# Patient Record
Sex: FEMALE | Race: WHITE | NOT HISPANIC OR LATINO | Employment: STUDENT | URBAN - METROPOLITAN AREA
[De-identification: names, ages, dates, MRNs, and addresses within clinical notes are randomized per-mention and may not be internally consistent; named-entity substitution may affect disease eponyms.]

---

## 2018-10-04 ENCOUNTER — RADIANT APPOINTMENT (OUTPATIENT)
Dept: ULTRASOUND IMAGING | Facility: CLINIC | Age: 23
End: 2018-10-04
Attending: PHYSICIAN ASSISTANT
Payer: COMMERCIAL

## 2018-10-04 ENCOUNTER — MEDICAL CORRESPONDENCE (OUTPATIENT)
Dept: HEALTH INFORMATION MANAGEMENT | Facility: CLINIC | Age: 23
End: 2018-10-04

## 2018-10-04 ENCOUNTER — TRANSFERRED RECORDS (OUTPATIENT)
Dept: HEALTH INFORMATION MANAGEMENT | Facility: CLINIC | Age: 23
End: 2018-10-04

## 2018-10-04 DIAGNOSIS — R30.0 DYSURIA: ICD-10-CM

## 2018-10-04 DIAGNOSIS — R10.2 PELVIC PAIN: ICD-10-CM

## 2018-10-09 ENCOUNTER — PRE VISIT (OUTPATIENT)
Dept: UROLOGY | Facility: CLINIC | Age: 23
End: 2018-10-09

## 2018-10-09 NOTE — TELEPHONE ENCOUNTER
MEDICAL RECORDS REQUEST   [facility]  Urology Clinic  [address] [place] [address]  PHONE: [phone]  Fax: [phone]        FUTURE VISIT INFORMATION                                                   Rubi Gonsales, : 1995 scheduled for future visit at [facility]    APPOINTMENT INFORMATION:    Date: 2018    Provider:  Jennyfer Sheehan    Reason for Visit/Diagnosis: Pelvic Pain Dysuria    REFERRAL INFORMATION:    Referring provider:  Maggi Geller    Specialty: MD    Referring providers clinic:  [facility] contact number:  [phone]    RECORDS REQUESTED FOR VISIT                                                     NOTES  STATUS/DETAILS   OFFICE NOTE from referring provider  yes   OFFICE NOTE from other specialist  no   DISCHARGE SUMMARY from hospital  no   DISCHARGE REPORT from the ER  no   OPERATIVE REPORT  no   MEDICATION LIST  yes       PRE-VISIT CHECKLIST      Record collection complete Yes   Appointment appropriately scheduled           (right time/right provider) Yes   MyChart activation If no, please explain in process   Questionnaire complete If no, please explain in process     Completed by: Mercy Arguelles

## 2018-11-20 ENCOUNTER — PRE VISIT (OUTPATIENT)
Dept: UROLOGY | Facility: CLINIC | Age: 23
End: 2018-11-20

## 2018-11-20 NOTE — TELEPHONE ENCOUNTER
Patient with history of pelvic pain and dysuria coming in for consult. Patient chart reviewed, no need for call, all records available and ready for appointment.  UA dip and PVR.

## 2018-11-28 ENCOUNTER — OFFICE VISIT (OUTPATIENT)
Dept: UROLOGY | Facility: CLINIC | Age: 23
End: 2018-11-28
Payer: COMMERCIAL

## 2018-11-28 VITALS
SYSTOLIC BLOOD PRESSURE: 116 MMHG | WEIGHT: 160 LBS | DIASTOLIC BLOOD PRESSURE: 69 MMHG | HEART RATE: 59 BPM | BODY MASS INDEX: 24.25 KG/M2 | HEIGHT: 68 IN

## 2018-11-28 DIAGNOSIS — N30.00 ACUTE CYSTITIS WITHOUT HEMATURIA: Primary | ICD-10-CM

## 2018-11-28 LAB
APPEARANCE UR: CLEAR
BILIRUB UR QL: NORMAL
COLOR UR: YELLOW
GLUCOSE URINE: NORMAL MG/DL
HGB UR QL: NORMAL
KETONES UR QL: NORMAL MG/DL
LEUKOCYTE ESTERASE URINE: NORMAL
NITRITE UR QL STRIP: NORMAL
PH UR STRIP: 5.5 PH (ref 5–7)
PROTEIN ALBUMIN URINE: NORMAL MG/DL
SOURCE: NORMAL
SP GR UR STRIP: 1 (ref 1–1.03)
UROBILINOGEN UR QL STRIP: 0.2 EU/DL (ref 0.2–1)

## 2018-11-28 RX ORDER — NITROFURANTOIN 25; 75 MG/1; MG/1
CAPSULE ORAL
Refills: 0 | COMMUNITY
Start: 2018-09-22 | End: 2020-03-06

## 2018-11-28 ASSESSMENT — ENCOUNTER SYMPTOMS
DIFFICULTY URINATING: 0
FLANK PAIN: 0
DYSURIA: 1
HEMATURIA: 0

## 2018-11-28 ASSESSMENT — PAIN SCALES - GENERAL: PAINLEVEL: NO PAIN (0)

## 2018-11-28 NOTE — NURSING NOTE
Chief Complaint   Patient presents with     Consult     pelvic pain and dysuria coming in for consult       Zoe Kennedy MA

## 2018-11-28 NOTE — LETTER
"11/28/2018       RE: Rubi Gonsales  [address]  [address]  [place] [address]     Dear Colleague,    Thank you for referring your patient, Rubi Gonsales, to the [facility] UROLOGY AND INST FOR PROSTATE AND UROLOGIC CANCERS at [facility]. Please see a copy of my visit note below.    We are pleased to see Miss. Rubi Gonsales in consultation at the request of Maggi Geller for the evaluation of chief complaint listed below    Chief Complaint:    Ho hematuria and dysuria          History of Present Illness:   Rubi Gonsales is a(n) 22 year old female w/ no significant PMH  or urologic history who started having hematuria and dysuria and was diagnosed with pyelonephritis in setting of positive UA and elevated WBC. Her symptoms improved  With antibiotics and but then had 2 episodes of dysuria and hematuria that were treated and got better with antibiotics (although Cx has been negative)  Since then she has been doing okay with no symptoms except discomfort in bladder area. No more gross hematuria            Past Medical History:   None          Past Surgical History:   None          Social History:           Smoking: none  Alcohol: occasional   IV Drug Use: None         Family History:   No family history on file.  No urologic cancers in the family.         Allergies:     Allergies   Allergen Reactions     Cephalosporins Rash     Penicillins Rash            Medications:     Current Outpatient Prescriptions   Medication Sig     FLUoxetine (PROZAC) 20 MG capsule TAKE 1 CAPSULE BY MOUTH EVERY DAY     nitroFURantoin macrocrystal-monohydrate (MACROBID) 100 MG capsule TAKE 1 CAPSULE BY MOUTH TWO TIMES A DAY FOR 7 DAYS.     No current facility-administered medications for this visit.           PHYSICAL EXAM   /69  Pulse 59  Ht 1.727 m (5' 8\")  Wt 72.6 kg (160 lb)  BMI 24.33 kg/m2  GENERAL: No acute distress. Well nourished.   HEENT:  Sclerae anicteric.  Conjunctivae pink.  Moist " mucous membranes.  NECK:  Supple.  No lymphadenopathy.  CARDIAC:  Regular rate and rhythm.  LUNGS:  Non-labored breathing  BACK:  No costovertebral tenderness.  ABDOMEN: Soft, non-tender, no surgical scars, no organomegaly, non-tender.  SKIN: No rashes.  Dry.     EXTREMITIES:  Warm, well perfused.  no lower extremity edema bilaterally.  NEURO: normal gait, no focal deficits.           LABS AND IMAGING:   Renal and pelvis US reviewed, WNL          ASSESSMENT:   Rubi Gonsales is a 22 year old female who presents for recent UTIs.   No anatomic abnormalities seen on imaging and patient is not having symptoms at this point   We discussed that we will hold off any more work up for now unless she has more infections or symptoms. She will call with updates     Attila Cordova MD  Urology PGY4    Patient was seen, evaluated and plan was formulated in conjunction with me and I agree with the above.  Jennyfer Sheehan MD    CC: Duyen

## 2018-11-28 NOTE — PROGRESS NOTES
"We are pleased to see Miss. Rubi Gonsales in consultation at the request of Maggi Geller for the evaluation of chief complaint listed below    Chief Complaint:    Ho hematuria and dysuria          History of Present Illness:   Rubi Gonsales is a(n) 22 year old female w/ no significant PMH  or urologic history who started having hematuria and dysuria and was diagnosed with pyelonephritis in setting of positive UA and elevated WBC. Her symptoms improved  With antibiotics and but then had 2 episodes of dysuria and hematuria that were treated and got better with antibiotics (although Cx has been negative)  Since then she has been doing okay with no symptoms except discomfort in bladder area. No more gross hematuria            Past Medical History:   None          Past Surgical History:   None          Social History:           Smoking: none  Alcohol: occasional   IV Drug Use: None         Family History:   No family history on file.  No urologic cancers in the family.         Allergies:     Allergies   Allergen Reactions     Cephalosporins Rash     Penicillins Rash            Medications:     Current Outpatient Prescriptions   Medication Sig     FLUoxetine (PROZAC) 20 MG capsule TAKE 1 CAPSULE BY MOUTH EVERY DAY     nitroFURantoin macrocrystal-monohydrate (MACROBID) 100 MG capsule TAKE 1 CAPSULE BY MOUTH TWO TIMES A DAY FOR 7 DAYS.     No current facility-administered medications for this visit.             REVIEW OF SYSTEMS:    See HPI for pertinent details.  Remainder of 10-point ROS negative.         PHYSICAL EXAM   /69  Pulse 59  Ht 1.727 m (5' 8\")  Wt 72.6 kg (160 lb)  BMI 24.33 kg/m2  GENERAL: No acute distress. Well nourished.   HEENT:  Sclerae anicteric.  Conjunctivae pink.  Moist mucous membranes.  NECK:  Supple.  No lymphadenopathy.  CARDIAC:  Regular rate and rhythm.  LUNGS:  Non-labored breathing  BACK:  No costovertebral tenderness.  ABDOMEN: Soft, non-tender, no surgical scars, no " organomegaly, non-tender.  SKIN: No rashes.  Dry.     EXTREMITIES:  Warm, well perfused.  no lower extremity edema bilaterally.  NEURO: normal gait, no focal deficits.           LABS AND IMAGING:   Renal and pelvis US reviewed, WNL          ASSESSMENT:   Rubi Gonsales is a 22 year old female who presents for recent UTIs.   No anatomic abnormalities seen on imaging and patient is not having symptoms at this point   We discussed that we will hold off any more work up for now unless she has more infections or symptoms. She will call with updates     Attila Cordova MD  Urology PGY4    Patient was seen, evaluated and plan was formulated in conjunction with me and I agree with the above.  Jennyfer Sheehan MD    CC: Duyen    Answers for HPI/ROS submitted by the patient on 11/28/2018   General Symptoms: No  Skin Symptoms: No  HENT Symptoms: No  EYE SYMPTOMS: No  HEART SYMPTOMS: No  LUNG SYMPTOMS: No  INTESTINAL SYMPTOMS: No  URINARY SYMPTOMS: Yes  GYNECOLOGIC SYMPTOMS: No  BREAST SYMPTOMS: No  SKELETAL SYMPTOMS: No  BLOOD SYMPTOMS: No  NERVOUS SYSTEM SYMPTOMS: No  MENTAL HEALTH SYMPTOMS: No  Trouble holding urine or incontinence: No  Pain or burning: Yes  Trouble starting or stopping: No  Increased frequency of urination: Yes  Blood in urine: No  Decreased frequency of urination: No  Frequent nighttime urination: No  Flank pain: No  Difficulty emptying bladder: No

## 2018-11-28 NOTE — MR AVS SNAPSHOT
"              After Visit Summary   11/28/2018    Rubi Gonsales    MRN: [id number]           Patient Information     Date Of Birth          1995        Visit Information        Provider Department      11/28/2018 2:00 PM Jennyfer Sheehan MD [facility]        Today's Diagnoses     Acute cystitis without hematuria    -  1       Follow-ups after your visit        Follow-up notes from your care team     Return if symptoms worsen or fail to improve.      Who to contact     Please call your clinic at [phone] to:    Ask questions about your health    Make or cancel appointments    Discuss your medicines    Learn about your test results    Speak to your doctor            Additional Information About Your Visit        Care EveryWhere ID     This is your Care EveryWhere ID. This could be used by other organizations to access your [facility] medical records  [id number]        Your Vitals Were     Pulse Height BMI (Body Mass Index)             59 1.727 m (5' 8\") 24.33 kg/m2          Blood Pressure from Last 3 Encounters:   11/28/18 116/69    Weight from Last 3 Encounters:   11/28/18 72.6 kg (160 lb)              We Performed the Following     POST-VOID RESIDUAL BLADDER SCAN     Urinalysis chemical screen POCT        Primary Care Provider Office Phone # Fax #    [facility] MD Mkaenzie [phone]       [address] [place] [address]        Equal Access to Services     LATONIA DOBBS AH: Hadii marcella ku hadasho Soomaali, waaxda luqadaha, qaybta kaalmada adeegyada, chris gaviriain haycelinan nish santa. So [facility] [phone].    ATENCIÓN: Si habla español, tiene a menendez disposición servicios gratuitos de asistencia lingüística. Llame al [phone].    We comply with applicable federal civil rights laws and [place] laws. We do not discriminate on the basis of race, color, national origin, age, disability, sex, sexual orientation, or gender " identity.            Thank you!     Thank you for choosing [facility] UROLOGY AND [facility] FOR PROSTATE AND UROLOGIC CANCERS  for your care. Our goal is always to provide you with excellent care. Hearing back from our patients is one way we can continue to improve our services. Please take a few minutes to complete the written survey that you may receive in the mail after your visit with us. Thank you!             Your Updated Medication List - Protect others around you: Learn how to safely use, store and throw away your medicines at www.disposemymeds.org.          This list is accurate as of 11/28/18  3:06 PM.  Always use your most recent med list.                   Brand Name Dispense Instructions for use Diagnosis    FLUoxetine 20 MG capsule    PROzac     TAKE 1 CAPSULE BY MOUTH EVERY DAY        nitroFURantoin macrocrystal-monohydrate 100 MG capsule    MACROBID     TAKE 1 CAPSULE BY MOUTH TWO TIMES A DAY FOR 7 DAYS.

## 2019-03-07 ENCOUNTER — TRANSFERRED RECORDS (OUTPATIENT)
Dept: MULTI SPECIALTY CLINIC | Facility: CLINIC | Age: 24
End: 2019-03-07

## 2019-03-07 LAB — PAP SMEAR - HIM PATIENT REPORTED: NEGATIVE

## 2019-06-25 ENCOUNTER — PRE VISIT (OUTPATIENT)
Dept: UROLOGY | Facility: CLINIC | Age: 24
End: 2019-06-25

## 2019-06-25 NOTE — TELEPHONE ENCOUNTER
Reason for Visit: Follow up symptom check    Diagnosis: rUTIs    Orders/Procedures/Records: n/a    Contact Patient: n/a    Rooming Requirements: UA dip / PVR      Dasia Martínez LPN  06/25/19  11:26 AM

## 2020-02-27 NOTE — PROGRESS NOTES
SUBJECTIVE:                                                   Rubi Gonsales is a 24 year old female who presents to clinic today for the following health issue(s):  Patient presents with:  Urinary Problem: c/o having recurring bladder infections for the past 1.5 years; states always gets them after intercourse    Additional info: Has seen urologist on campus, wasn't super helpful since she wasn't having any symptoms at time    HPI:  Starting 1.5 years ago started getting UTIs after IC. Has had positive cultures. Has dysuria, frequency, urgency. Definitely notes after IC. Now avoiding due to fear of UTI.  Has had Ecoli sensitive to macrobid.  Pt has only been noting UTIs with this partner. No other partner in the 1/5 years.     Patient's last menstrual period was 02/23/2020 (exact date)..     Patient is sexually active but hasn't been currently due to urinary issues.  Using nuvaring for contraception.    reports that she has never smoked. She has never used smokeless tobacco.    STD testing offered?  Declined    Health maintenance updated:  Yes    Please abstract the following data from this visit with this patient into the appropriate field in Epic:    Tests that can be patient reported without a hard copy:    Pap smear done on this date: 3/7/19 (approximately), by this group: [employer], results were normal.       Today's PHQ-9 Score:   PHQ-9 SCORE 3/6/2020   PHQ-9 Total Score 1     Today's YVETTE-7 Score:   YVETTE-7 SCORE 3/6/2020   Total Score 4       Problem list and histories reviewed & adjusted, as indicated.  Additional history: as documented.    There is no problem list on file for this patient.    Past Surgical History:   Procedure Laterality Date     NO HISTORY OF SURGERY        Social History     Tobacco Use     Smoking status: Never Smoker     Smokeless tobacco: Never Used   Substance Use Topics     Alcohol use: Yes      Problem (# of Occurrences) Relation (Name,Age of Onset)    Hypertension (2)  "Brother, Maternal Grandmother    No Known Problems (6) Mother, Father, Sister, Maternal Grandfather, Paternal Grandmother, Other            Current Outpatient Medications   Medication Sig     etonogestrel-ethinyl estradiol (NUVARING) 0.12-0.015 MG/24HR vaginal ring Insert vaginally and leave in place for 3 consecutive weeks, then remove for 1 week.     FLUoxetine (PROZAC) 10 MG capsule      nitroFURantoin macrocrystal (MACRODANTIN) 100 MG capsule Take 1 capsule by mouth after intercourse.     No current facility-administered medications for this visit.      Allergies   Allergen Reactions     Cephalosporins Rash     Penicillins Rash       ROS:  12 point review of systems negative other than symptoms noted below or in the HPI.  Psychiatric: Anxiety and Depression        OBJECTIVE:     Pulse 74   Ht 1.727 m (5' 8\")   Wt 80.6 kg (177 lb 9.6 oz)   LMP 02/23/2020 (Exact Date)   BMI 27.00 kg/m    Body mass index is 27 kg/m .    Exam:  Constitutional:  Appearance: Well nourished, well developed alert, in no acute distress  Neurologic:  Mental Status:  Oriented X3.  Normal strength and tone, sensory exam grossly normal, mentation intact and speech normal.    Psychiatric:  Mentation appears normal and affect normal/bright.     In-Clinic Test Results:  Results for orders placed or performed in visit on 03/06/20 (from the past 24 hour(s))   UA with Microscopic   Result Value Ref Range    Color Urine Yellow     Appearance Urine Clear     Glucose Urine Negative NEG^Negative mg/dL    Bilirubin Urine Negative NEG^Negative    Ketones Urine Negative NEG^Negative mg/dL    Specific Gravity Urine 1.020 1.003 - 1.035    pH Urine 7.0 5.0 - 7.0 pH    Protein Albumin Urine Negative NEG^Negative mg/dL    Urobilinogen Urine 0.2 0.2 - 1.0 EU/dL    Nitrite Urine Negative NEG^Negative    Blood Urine Negative NEG^Negative    Leukocyte Esterase Urine Negative NEG^Negative    Source Midstream Urine     WBC Urine 0 - 5 OTO5^0 - 5 /HPF    RBC " Urine O - 2 OTO2^O - 2 /HPF       ASSESSMENT/PLAN:                                                        ICD-10-CM    1. Recurrent UTI N39.0 nitroFURantoin macrocrystal (MACRODANTIN) 100 MG capsule   2. Dysuria R30.0 UA with Microscopic         Discussed female anatomy and risk for UTI with intercourse.   Will take one antibiotic after intercourse. If has UTI symptoms can self treat with 100mg QID for 7 days. If has breakthrough UTIs should consider UC for bacteria type and sensitivities.       Ancelmo Arguelles MD  [facility] [place]

## 2020-03-06 ENCOUNTER — OFFICE VISIT (OUTPATIENT)
Dept: OBGYN | Facility: CLINIC | Age: 25
End: 2020-03-06
Payer: COMMERCIAL

## 2020-03-06 VITALS — WEIGHT: 177.6 LBS | HEIGHT: 68 IN | BODY MASS INDEX: 26.92 KG/M2 | HEART RATE: 74 BPM

## 2020-03-06 DIAGNOSIS — R30.0 DYSURIA: ICD-10-CM

## 2020-03-06 DIAGNOSIS — N39.0 RECURRENT UTI: Primary | ICD-10-CM

## 2020-03-06 LAB
ALBUMIN UR-MCNC: NEGATIVE MG/DL
APPEARANCE UR: CLEAR
BILIRUB UR QL STRIP: NEGATIVE
COLOR UR AUTO: YELLOW
GLUCOSE UR STRIP-MCNC: NEGATIVE MG/DL
HGB UR QL STRIP: NEGATIVE
KETONES UR STRIP-MCNC: NEGATIVE MG/DL
LEUKOCYTE ESTERASE UR QL STRIP: NEGATIVE
NITRATE UR QL: NEGATIVE
PH UR STRIP: 7 PH (ref 5–7)
RBC #/AREA URNS AUTO: NORMAL /HPF
SOURCE: NORMAL
SP GR UR STRIP: 1.02 (ref 1–1.03)
UROBILINOGEN UR STRIP-ACNC: 0.2 EU/DL (ref 0.2–1)
WBC #/AREA URNS AUTO: NORMAL /HPF

## 2020-03-06 PROCEDURE — 81001 URINALYSIS AUTO W/SCOPE: CPT | Performed by: OBSTETRICS & GYNECOLOGY

## 2020-03-06 PROCEDURE — 99203 OFFICE O/P NEW LOW 30 MIN: CPT | Performed by: OBSTETRICS & GYNECOLOGY

## 2020-03-06 RX ORDER — ETONOGESTREL AND ETHINYL ESTRADIOL VAGINAL RING .015; .12 MG/D; MG/D
RING VAGINAL
COMMUNITY
Start: 2019-06-12 | End: 2023-06-26

## 2020-03-06 RX ORDER — NITROFURANTOIN MACROCRYSTAL 100 MG
CAPSULE ORAL
Qty: 90 CAPSULE | Refills: 1 | Status: SHIPPED | OUTPATIENT
Start: 2020-03-06 | End: 2021-05-14

## 2020-03-06 RX ORDER — FLUOXETINE 10 MG/1
CAPSULE ORAL
COMMUNITY
Start: 2020-02-17 | End: 2021-05-14

## 2020-03-06 ASSESSMENT — ANXIETY QUESTIONNAIRES
3. WORRYING TOO MUCH ABOUT DIFFERENT THINGS: NOT AT ALL
2. NOT BEING ABLE TO STOP OR CONTROL WORRYING: SEVERAL DAYS
1. FEELING NERVOUS, ANXIOUS, OR ON EDGE: SEVERAL DAYS
GAD7 TOTAL SCORE: 4
IF YOU CHECKED OFF ANY PROBLEMS ON THIS QUESTIONNAIRE, HOW DIFFICULT HAVE THESE PROBLEMS MADE IT FOR YOU TO DO YOUR WORK, TAKE CARE OF THINGS AT HOME, OR GET ALONG WITH OTHER PEOPLE: SOMEWHAT DIFFICULT
5. BEING SO RESTLESS THAT IT IS HARD TO SIT STILL: NOT AT ALL
7. FEELING AFRAID AS IF SOMETHING AWFUL MIGHT HAPPEN: NOT AT ALL
6. BECOMING EASILY ANNOYED OR IRRITABLE: SEVERAL DAYS

## 2020-03-06 ASSESSMENT — MIFFLIN-ST. JEOR: SCORE: 1594.09

## 2020-03-06 ASSESSMENT — PATIENT HEALTH QUESTIONNAIRE - PHQ9
5. POOR APPETITE OR OVEREATING: SEVERAL DAYS
SUM OF ALL RESPONSES TO PHQ QUESTIONS 1-9: 1

## 2020-03-06 NOTE — Clinical Note
Please abstract the following data from this visit with this patient into the appropriate field in Epic:Tests that can be patient reported without a hard copy:Pap smear done on this date: 3/7/19 (approximately), by this group: [email or web address], results were normal.

## 2020-03-07 ASSESSMENT — ANXIETY QUESTIONNAIRES: GAD7 TOTAL SCORE: 4

## 2020-03-11 ENCOUNTER — HEALTH MAINTENANCE LETTER (OUTPATIENT)
Age: 25
End: 2020-03-11

## 2021-01-03 ENCOUNTER — HEALTH MAINTENANCE LETTER (OUTPATIENT)
Age: 26
End: 2021-01-03

## 2021-04-13 ENCOUNTER — MEDICAL CORRESPONDENCE (OUTPATIENT)
Dept: HEALTH INFORMATION MANAGEMENT | Facility: CLINIC | Age: 26
End: 2021-04-13

## 2021-04-13 ENCOUNTER — TRANSFERRED RECORDS (OUTPATIENT)
Dept: HEALTH INFORMATION MANAGEMENT | Facility: CLINIC | Age: 26
End: 2021-04-13

## 2021-04-14 ENCOUNTER — TRANSFERRED RECORDS (OUTPATIENT)
Dept: HEALTH INFORMATION MANAGEMENT | Facility: CLINIC | Age: 26
End: 2021-04-14

## 2021-04-14 LAB
CREAT SERPL-MCNC: 0.88 MG/DL (ref 0.55–1.02)
GFR SERPL CREATININE-BSD FRML MDRD: >60 ML/MIN/1.73M^2
GLUCOSE SERPL-MCNC: 87 MG/DL (ref 70–124)
HBA1C MFR BLD: 5.1 % (ref 4.3–5.6)
POTASSIUM SERPL-SCNC: 4.4 MMOL/L (ref 3.4–5.3)

## 2021-04-25 ENCOUNTER — HEALTH MAINTENANCE LETTER (OUTPATIENT)
Age: 26
End: 2021-04-25

## 2021-05-14 ENCOUNTER — VIRTUAL VISIT (OUTPATIENT)
Dept: SLEEP MEDICINE | Facility: CLINIC | Age: 26
End: 2021-05-14
Payer: COMMERCIAL

## 2021-05-14 DIAGNOSIS — G47.10 HYPERSOMNIA: Primary | ICD-10-CM

## 2021-05-14 PROCEDURE — 99204 OFFICE O/P NEW MOD 45 MIN: CPT | Mod: 95 | Performed by: FAMILY MEDICINE

## 2021-05-14 RX ORDER — ESCITALOPRAM OXALATE 10 MG/1
10 TABLET ORAL DAILY
COMMUNITY
End: 2023-06-26

## 2021-05-14 NOTE — PROGRESS NOTES
"Rubi Gonsales is a 25 year old female who is being evaluated via a billable video visit.       The patient has been notified of following:      \"This video visit will be conducted via a call between you and your physician/provider. We have found that certain health care needs can be provided without the need for an in-person physical exam.  This service lets us provide the care you need with a video conversation.  If a prescription is necessary we can send it directly to your pharmacy.  If lab work is needed we can place an order for that and you can then stop by our lab to have the test done at a later time.     Video visits are billed at different rates depending on your insurance coverage.  Please reach out to your insurance provider with any questions.     If during the course of the call the physician/provider feels a video visit is not appropriate, you will not be charged for this service.\"     Patient has given verbal consent for Video visit? Yes  How would you like to obtain your AVS? Mail a copy  If you are dropped from the video visit, the video invite should be resent to: Text to cell phone: [email or web address]  Will anyone else be joining your video visit? No  If patient encounters technical issues they should call [phone]      Video-Visit Details     Type of service:  Video Visit     Video Start Time: 2:30pm  Video End Time: 3:15pm    Originating Location (pt. Location): Home     Distant Location (provider location):  [facility]      Platform used for Video Visit: [employer]    Virtual visit for excessive daytime sleepiness.     Assessment:  -Low clinical concern for sleep disordered breathing  -Appears to be getting adequate total sleep time.  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    SUBJECTIVE:  Rubi Gonsales is a 25 year old year old female.    Referred by Nydia Pretty at [facility] for chronic daytime fatigue / sleepiness.    Pertinent PMHx of depression, anxiety, acne.    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  Note of life long issues of fatigue, worse over past year.  No significant change in adjustment of selective serotonin reuptake inhibitor from fluoxetine to lexapro, dosing AM vs PM, trial off medication.  Note of significant difficulty with sleepiness on recent 4 hour drive to [facility], had to stop every 30 minutes.  Reporting 8+ hours of sleep, trial of sleeping adlib from 8:30pm - 7am with only mild improvement.    Today -we reviewed her history in detail.  She first feels she started noticing having excessive and inappropriate daytime sleepiness and undergraduate, difficulty staying awake and various types of classes and more challenging workload.  She eventually had some lesser symptoms in high school, or possibly were less noticeable due to a less rigorous classes.  She has most significantly noted sleepiness since the start of graduate school, with the increased amount of reading and research time.  She feels her most challenging times her symptoms are between noon and 6 PM.  She reports that she has had some previous blood work, including a normal thyroid test.    During the school week, her sleep-wake pattern is to go to bed at 10 PM and fall asleep quickly, no frequent or prolonged awakenings, up at 6 AM by alarm.  On weekends she feels she can sleep for as long as 12 hours if allowed.  She has meets her average total sleep time to 8-10+ hours.    Denies snoring.  Denies observed apnea.  Occasional sleep talking.  Denies any sleepwalking or dream enacting behavior.    Denies sleep paralysis, denies hypnopompic or hypnagogic hallucinations.  She answers to potentially  very subtle cataplexy, but is not clear.    Family history: No sleep disorders, hypertension, thyroid disease.    Caffeine use: Less than 3/day, may have 1 caffeinated drink in the early afternoon.    Alcohol use: Minimal, worsening sleepiness.    Other drug use: None    ESS 17.    She is pursuing a PhD in archaeology, and recently was collecting graft samples.       Past medical history:    There are no active problems to display for this patient.      10 point ROS of systems including Constitutional, Eyes, Respiratory, Cardiovascular, Gastroenterology, Genitourinary, Integumentary, Muscularskeletal, Psychiatric were all negative except for pertinent positives noted in my HPI.    Current Outpatient Medications   Medication Sig Dispense Refill     etonogestrel-ethinyl estradiol (NUVARING) 0.12-0.015 MG/24HR vaginal ring Insert vaginally and leave in place for 3 consecutive weeks, then remove for 1 week.       FLUoxetine (PROZAC) 10 MG capsule        nitroFURantoin macrocrystal (MACRODANTIN) 100 MG capsule Take 1 capsule by mouth after intercourse. 90 capsule 1       OBJECTIVE:  There were no vitals taken for this visit.    Physical Exam     ---  This note was written with the assistance of the Dragon voice-dictation technology software. The final document, although reviewed, may contain errors. For corrections, please contact the office.    Sachin Wei MD    Sleep Medicine  [facility]  ([phone])  [facility]  ([phone])

## 2021-05-14 NOTE — PATIENT INSTRUCTIONS

## 2021-05-14 NOTE — PROGRESS NOTES
"Rubi is a 25 year old who is being evaluated via a billable video visit.      Do you have wifi? Yes  Do you have a smart phone? Yes  Can you download an georgi on your phone comfortably with out assistance? Yes  Can you watch a Youtube video? Yes    How would you like to obtain your AVS? MyChart  If the video visit is dropped, the invitation should be resent by: Send to e-mail at: [email or web address]  Will anyone else be joining your video visit? No  {If patient encounters technical issues they should call [phone] :[id number]}    Alexandra Matamoros MA on 5/14/2021 at 8:45 AM    Video Start Time: {video visit start/end time for provider to select:152948}  Video-Visit Details    Type of service:  Video Visit    Video End Time:{video visit start/end time for provider to select:935625}    Originating Location (pt. Location): {video visit patient location:405313::\"Home\"}    Distant Location (provider location):  [facility]     Platform used for Video Visit: {Virtual Visit Platforms:203696::\"Well\"}  "

## 2021-06-02 DIAGNOSIS — N39.0 RECURRENT UTI: ICD-10-CM

## 2021-06-03 RX ORDER — NITROFURANTOIN MACROCRYSTAL 100 MG
CAPSULE ORAL
Qty: 30 CAPSULE | Refills: 5 | OUTPATIENT
Start: 2021-06-03

## 2021-06-03 NOTE — TELEPHONE ENCOUNTER
Requested Prescriptions   Pending Prescriptions Disp Refills     nitroFURantoin macrocrystal (MACRODANTIN) 100 MG capsule [Pharmacy Med Name: NITROFURANTOIN  MG CAP] 30 capsule 5     Sig: TAKE 1 CAPSULE BY MOUTH AFTER INTERCOURSE.       There is no refill protocol information for this order      NOT ON CURRENT MED LIST   Last office visit: 3/6/2020 with prescribing provider:  Immerman   Future Office Visit:  none

## 2021-06-03 NOTE — TELEPHONE ENCOUNTER
Last Rx sent 3/6/2020 to be used after IC  90 caps given at that time by Dr Arguelles 3/6/20. Has not been seen in clinic since.    Refused refill request - needs apt.    Pat Dukes RN on 6/3/2021 at 12:31 PM

## 2021-06-14 ENCOUNTER — TELEPHONE (OUTPATIENT)
Dept: SLEEP MEDICINE | Facility: CLINIC | Age: 26
End: 2021-06-14

## 2021-06-14 NOTE — TELEPHONE ENCOUNTER
Reason for Call:  Other appointment    Detailed comments: patient needs to schedule a watch study for 2 weeks.  Patient also [facility]  Please contact patient.  Thank you.    Phone Number Patient can be reached at: Home number on file [phone] (home)    Best Time: any    Can we leave a detailed message on this number? YES    Call taken on 6/14/2021 at 3:22 PM by Zayra Gimenez

## 2021-06-17 NOTE — TELEPHONE ENCOUNTER
Chart routed to provider to review sleep study order. Will call patient once order is clarified.       Zoe Wynn CMA on 6/17/2021 at 10:26 AM

## 2021-06-21 ENCOUNTER — TELEPHONE (OUTPATIENT)
Dept: SLEEP MEDICINE | Facility: CLINIC | Age: 26
End: 2021-06-21

## 2021-06-21 NOTE — TELEPHONE ENCOUNTER
Reason for call:  Other   Patient called regarding (reason for call): appointment  Additional comments: Per patient: needs to schedule an overnight sleep study    Phone number to reach patient:  Cell number on file:    Telephone Information:   Mobile [phone]       Best Time:  Anytime    Can we leave a detailed message on this number?  YES    Travel screening: Not Applicable

## 2021-06-23 ENCOUNTER — TELEPHONE (OUTPATIENT)
Dept: SLEEP MEDICINE | Facility: CLINIC | Age: 26
End: 2021-06-23

## 2021-06-23 NOTE — TELEPHONE ENCOUNTER
Left message for patient to let her know we need to know which location she would prefer and than we can pass that information on to the sleep technicians.

## 2021-06-23 NOTE — TELEPHONE ENCOUNTER
Reason for call:  Other   Patient called regarding (reason for call): call back  Additional comments: Patient wanting to schedule sleep study as her consult was 5/14/21 and she hasn't herd from anyone. Stated she never received a voicemail. Verified number is correct number. She isnt sure why they would ask what location when she assumed it would be at the  sleep center.      Phone number to reach patient:  Cell number on file:    Telephone Information:   Mobile [phone]       Best Time:  Anytime    Can we leave a detailed message on this number?  YES    Travel screening: Not Applicable

## 2021-06-30 ENCOUNTER — TELEPHONE (OUTPATIENT)
Dept: SLEEP MEDICINE | Facility: CLINIC | Age: 26
End: 2021-06-30

## 2021-06-30 NOTE — TELEPHONE ENCOUNTER
Reason for call:  Other   Patient called regarding (reason for call): appointment  Additional comments: Patient had consult 5/14/21, has left 3 messages requesting a call to schedule sleep study. Please call patient to schedule appointment and leave direct call back number if leaving VM    Phone number to reach patient:  Home number on file [phone] (home)    Best Time:  any    Can we leave a detailed message on this number?  YES    Travel screening: Not Applicable

## 2021-07-15 NOTE — TELEPHONE ENCOUNTER
Spoke with patient she would like to have hypersomnia work up done in [place], message sent to staff. Patient will be contacted to schedule.     Mercy Mcfarlane MA

## 2021-07-16 ENCOUNTER — TELEPHONE (OUTPATIENT)
Dept: SLEEP MEDICINE | Facility: CLINIC | Age: 26
End: 2021-07-16

## 2021-09-13 ENCOUNTER — OFFICE VISIT (OUTPATIENT)
Dept: SLEEP MEDICINE | Facility: CLINIC | Age: 26
End: 2021-09-13
Payer: COMMERCIAL

## 2021-09-13 DIAGNOSIS — G47.10 HYPERSOMNIA: Primary | ICD-10-CM

## 2021-09-26 ENCOUNTER — THERAPY VISIT (OUTPATIENT)
Dept: SLEEP MEDICINE | Facility: CLINIC | Age: 26
End: 2021-09-26
Payer: COMMERCIAL

## 2021-09-26 DIAGNOSIS — G47.10 HYPERSOMNIA: ICD-10-CM

## 2021-09-26 PROCEDURE — 95810 POLYSOM 6/> YRS 4/> PARAM: CPT | Performed by: FAMILY MEDICINE

## 2021-09-27 ENCOUNTER — LAB (OUTPATIENT)
Dept: LAB | Facility: CLINIC | Age: 26
End: 2021-09-27
Payer: COMMERCIAL

## 2021-09-27 ENCOUNTER — DOCUMENTATION ONLY (OUTPATIENT)
Dept: SLEEP MEDICINE | Facility: CLINIC | Age: 26
End: 2021-09-27
Payer: COMMERCIAL

## 2021-09-27 ENCOUNTER — THERAPY VISIT (OUTPATIENT)
Dept: SLEEP MEDICINE | Facility: CLINIC | Age: 26
End: 2021-09-27
Payer: COMMERCIAL

## 2021-09-27 DIAGNOSIS — G47.10 HYPERSOMNIA: ICD-10-CM

## 2021-09-27 DIAGNOSIS — G47.10 HYPERSOMNIA: Primary | ICD-10-CM

## 2021-09-27 PROCEDURE — 95805 MULTIPLE SLEEP LATENCY TEST: CPT | Performed by: FAMILY MEDICINE

## 2021-09-27 PROCEDURE — 80306 DRUG TEST PRSMV INSTRMNT: CPT

## 2021-09-28 LAB
AMPHETAMINES UR QL: NOT DETECTED
BARBITURATES UR QL SCN: NOT DETECTED
BENZODIAZ UR QL SCN: NOT DETECTED
BUPRENORPHINE UR QL: NOT DETECTED
CANNABINOIDS UR QL: NOT DETECTED
COCAINE UR QL SCN: NOT DETECTED
D-METHAMPHET UR QL: NOT DETECTED
METHADONE UR QL SCN: NOT DETECTED
OPIATES UR QL SCN: NOT DETECTED
OXYCODONE UR QL SCN: NOT DETECTED
PCP UR QL SCN: NOT DETECTED
PROPOXYPH UR QL: NOT DETECTED
TRICYCLICS UR QL SCN: NOT DETECTED

## 2021-10-04 LAB — SLPCOMP: NORMAL

## 2021-10-10 ENCOUNTER — HEALTH MAINTENANCE LETTER (OUTPATIENT)
Age: 26
End: 2021-10-10

## 2021-11-10 VITALS — BODY MASS INDEX: 23.7 KG/M2 | WEIGHT: 160 LBS | HEIGHT: 69 IN

## 2021-11-10 ASSESSMENT — MIFFLIN-ST. JEOR: SCORE: 1517.2

## 2021-11-10 NOTE — PATIENT INSTRUCTIONS
Your BMI is Body mass index is 23.97 kg/m .  Weight management is a personal decision.  If you are interested in exploring weight loss strategies, the following discussion covers the approaches that may be successful. Body mass index (BMI) is one way to tell whether you are at a healthy weight, overweight, or obese. It measures your weight in relation to your height.  A BMI of 18.5 to 24.9 is in the healthy range. A person with a BMI of 25 to 29.9 is considered overweight, and someone with a BMI of 30 or greater is considered obese. More than two-thirds of American adults are considered overweight or obese.  Being overweight or obese increases the risk for further weight gain. Excess weight may lead to heart disease and diabetes.  Creating and following plans for healthy eating and physical activity may help you improve your health.  Weight control is part of healthy lifestyle and includes exercise, emotional health, and healthy eating habits. Careful eating habits lifelong are the mainstay of weight control. Though there are significant health benefits from weight loss, long-term weight loss with diet alone may be very difficult to achieve- studies show long-term success with dietary management in less than 10% of people. Attaining a healthy weight may be especially difficult to achieve in those with severe obesity. In some cases, medications, devices and surgical management might be considered.  What can you do?  If you are overweight or obese and are interested in methods for weight loss, you should discuss this with your provider.     Consider reducing daily calorie intake by 500 calories.     Keep a food journal.     Avoiding skipping meals, consider cutting portions instead.    Diet combined with exercise helps maintain muscle while optimizing fat loss. Strength training is particularly important for building and maintaining muscle mass. Exercise helps reduce stress, increase energy, and improves fitness.  Increasing exercise without diet control, however, may not burn enough calories to loose weight.       Start walking three days a week 10-20 minutes at a time    Work towards walking thirty minutes five days a week     Eventually, increase the speed of your walking for 1-2 minutes at time    In addition, we recommend that you review healthy lifestyles and methods for weight loss available through the National Institutes of Health patient information sites:  http://win.niddk.nih.gov/publications/index.htm    And look into health and wellness programs that may be available through your health insurance provider, employer, local community center, or donell club.

## 2021-11-10 NOTE — PROGRESS NOTES
"Rubi is a 25 year old who is being evaluated via a billable video visit.      Are you currently in the state of [place]: Yes    How would you like to obtain your AVS? MyChart  If the video visit is dropped, the invitation should be resent by: Text to cell phone: [phone]  Will anyone else be joining your video visit? No  {If patient encounters technical issues they should call [phone] :[id number]}    Video Start Time: {video visit start/end time for provider to select:152948}  Video-Visit Details    Type of service:  Video Visit    Video End Time:{video visit start/end time for provider to select:152948}    Originating Location (pt. Location): {video visit patient location:331747::\"Home\"}    Distant Location (provider location):  [facility]     Platform used for Video Visit: {Virtual Visit Platforms:490780::\"Discera\"}    "

## 2021-11-11 NOTE — PROGRESS NOTES
"Rubi Gonsales is a 25 year old female who is being evaluated via a billable video visit.       The patient has been notified of following:      \"This video visit will be conducted via a call between you and your physician/provider. We have found that certain health care needs can be provided without the need for an in-person physical exam.  This service lets us provide the care you need with a video conversation.  If a prescription is necessary we can send it directly to your pharmacy.  If lab work is needed we can place an order for that and you can then stop by our lab to have the test done at a later time.     Video visits are billed at different rates depending on your insurance coverage.  Please reach out to your insurance provider with any questions.     If during the course of the call the physician/provider feels a video visit is not appropriate, you will not be charged for this service.\"     Patient has given verbal consent for Video visit? Yes  How would you like to obtain your AVS? Mail a copy  If you are dropped from the video visit, the video invite should be resent to: Text to cell phone: -  Will anyone else be joining your video visit? No  If patient encounters technical issues they should call [phone]      Video-Visit Details     Type of service:  Video Visit     Video Start Time: 0930  Video End Time: 1010    Originating Location (pt. Location): Home     Distant Location (provider location):  [facility]      Platform used for Video Visit: [employer]    Virtual visit for review of actigraphy, PSG, MSLT results.     Assessment:  - Actigraphy suggestive of adequate total sleep time, but also suspect longer sleep need and may represent inadequate sleep time with very short latencies.  - PSG without ABHIJEET, rare PLM's.  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    We spent the majority of our visit discussing narcolepsy, the long-term management including pharmacotherapy, strategic napping.  We discussed the importance of adequate daily sleep time.    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    She has most issues in the methylphenidate, given that there are days where she is very active on her weekends and feels she may not need medication.    Plan for start with methylphenidate immediate release 10 mg tablets, taking half-1 tablet twice daily-3 times daily as needed.    Plan for follow-up in roughly 1 month.  She will provide an update via [employer] in roughly 2 weeks.    SUBJECTIVE:  Rubi Gonsales is a 25 year old year old female.    Referred by Nydia Pretty at [facility] for chronic daytime fatigue / sleepiness.     Pertinent PMHx of depression, anxiety, acne.     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  Note of life long issues of fatigue, worse over past year.  No significant change in adjustment of selective serotonin reuptake inhibitor from fluoxetine to lexapro, dosing AM vs PM, trial off medication.  Note of significant difficulty with sleepiness on recent 4 hour drive to [facility], had to stop every 30 minutes.  Reporting 8+ hours of sleep, trial of sleeping adlib from 8:30pm - 7am with only mild improvement.     5/14/2021 -we reviewed her history in detail.  She first feels she started noticing having excessive and inappropriate daytime sleepiness and undergraduate, difficulty staying  awake and various types of classes and more challenging workload.  She eventually had some lesser symptoms in high school, or possibly were less noticeable due to a less rigorous classes.  She has most significantly noted sleepiness since the start of graduate school, with the increased amount of reading and research time.  She feels her most challenging times her symptoms are between noon and 6 PM.  She reports that she has had some previous blood work, including a normal thyroid test.     During the school week, her sleep-wake pattern is to go to bed at 10 PM and fall asleep quickly, no frequent or prolonged awakenings, up at 6 AM by alarm.  On weekends she feels she can sleep for as long as 12 hours if allowed.  She has meets her average total sleep time to 8-10+ hours.     Denies snoring.  Denies observed apnea.  Occasional sleep talking.  Denies any sleepwalking or dream enacting behavior.     Denies sleep paralysis, denies hypnopompic or hypnagogic hallucinations.  She answers to potentially very subtle cataplexy, but is not clear.     Family history: No sleep disorders, hypertension, thyroid disease.     Caffeine use: Less than 3/day, may have 1 caffeinated drink in the early afternoon.     Alcohol use: Minimal, worsening sleepiness.     Other drug use: None     ESS 17.     She is pursuing a PhD in archaeology, and recently was collecting graft samples.    A/P for actigraphy, PSG, MSLT.    Today -we reviewed her actigraphy, polysomnogram and multiple sleep latency testing in detail today.      Insomnia Severity Index    Difficulty falling asleep 0    Difficulty staying asleep 0    Problems waking up too early 0    How SATISFIED/DISSATISFIED are you with your CURRENT sleep pattern? 3    How NOTICEABLE to others do you think your sleep problem is in terms of impairing the quality of your life? 4    How WORRIED/DISTRESSED are you about your current sleep problem? 3    To what extent do you consider your sleep  "problem to INTERFERE with your daily functioning (e.g. daytime fatigue, mood, ability to function at work/daily chores, concentration, memory, mood, etc.) CURRENTLY? 3    Total Score 13         SLEEP STUDY INTERPRETATION  DIAGNOSTIC POLYSOMNOGRAPHY REPORT      Patient: SUSAN THOMAS  YOB: 1995  Study Date: 9/26/2021  MRN: [id number]  Referring Provider: -  Ordering Provider: Sachin Wei MD    Indications for Polysomnography: The patient is a 25 year old Female who is 5' 8\" and weighs 165.0 lbs. Her BMI is 25.3, [place] sleepiness scale 17 and neck circumference is 34 cm. Relevant medical history includes depression, anxiety, acne. A diagnostic polysomnogram was performed to evaluate for sleep apnea as part of evaluation of hypersomnia.    Polysomnogram Data: A full night polysomnogram recorded the standard physiologic parameters including EEG, EOG, EMG, ECG, nasal and oral airflow. Respiratory parameters of chest and abdominal movements were recorded with respiratory inductance plethysmography. Oxygen saturation was recorded by pulse oximetry. Hypopnea scoring rule used: 1B 4%.    Sleep Architecture: Normal sleep latency, normal REM latency, mildly increased arousal index, supine REM was observed.  The total recording time of the polysomnogram was 515.1 minutes. The total sleep time was 438.0 minutes. Sleep latency was normal at 17.1 minutes without the use of a sleep aid. REM latency was 108.0 minutes. Arousal index was mildly increased at 17.9 arousals per hour. Sleep efficiency was normal at 85.0%. Wake after sleep onset was 59.5 minutes. The patient spent 2.5% of total sleep time in Stage N1, 48.2% in Stage N2, 18.4% in Stage N3, and 30.9% in REM. Time in REM supine was 78.5 minutes.    Respiration: No significant sleep-disordered breathing (AHI 2.2, RDI 4.7) without sleep-associated hypoxemia.    Events ? The polysomnogram revealed a presence of 8 obstructive, 3 central, and 2 mixed " apneas resulting in an apnea index of 1.8 events per hour. There were 3 obstructive hypopneas and - central hypopneas resulting in an obstructive hypopnea index of 0.4 and central hypopnea index of - events per hour. The combined apnea/hypopnea index was 2.2 events per hour (central apnea/hypopnea index was 0.4 events per hour). The REM AHI was 6.2 events per hour. The supine AHI was 3.7 events per hour. The RERA index was 2.5 events per hour.  The RDI was 4.7 events per hour.    Snoring - was reported as intermittent, mild to moderate.    Respiratory rate and pattern - was notable for normal respiratory rate and pattern.    Sustained Sleep Associated Hypoventilation - Transcutaneous carbon dioxide monitoring was not used, however significant hypoventilation was not suggested by oximetry.    Sleep Associated Hypoxemia - (Greater than 5 minutes O2 sat at or below 88%) was not present. Baseline oxygen saturation was 96.8%. Lowest oxygen saturation was 85.8%. Time spent less than or equal to 88% was 0.1 minutes. Time spent less than or equal to 89% was 0.2 minutes.    Movement Activity: Infrequent PLM s observed.    Periodic Limb Activity - There were 78 PLMs during the entire study. The PLM index was 10.7 movements per hour. The PLM Arousal Index was 1.4 per hour.    REM EMG Activity - Excessive transient/sustained muscle activity was not present.    Nocturnal Behavior - Abnormal sleep related behaviors were not noted during/arising out of NREM / REM sleep.     Bruxism - None apparent.    Cardiac Summary: Appears NSR  The average pulse rate was 52.9 bpm. The minimum pulse rate was 40.0 bpm while the maximum pulse rate was 92.0 bpm.      Assessment:     Normal sleep latency, normal REM latency, mildly increased arousal index, supine REM was observed.    No significant sleep-disordered breathing (AHI 2.2, RDI 4.7) without sleep-associated hypoxemia.    Infrequent PLM s observed.    Actigraphy with sleep diaries was  "completed, results not available for review at this time.    Patient stayed for MSLT.    Recommendations:    Advice regarding the risks of drowsy driving.    Suggest optimizing sleep schedule and avoiding sleep deprivation.    Pharmacologic therapy should be used for management of restless legs syndrome only if present and clinically indicated and not based on the presence of periodic limb movements alone.    Diagnostic Codes:   Hypersomnia, Unspecified F51.11     _____________________________________   Electronically Signed By: Sachin Wei MD (10/1/2021)         Actigraphy results:  - Performed from 9/14/2021 - 9/27/2021.  Adequate signal quality, and sleep diaries were completed.  - Sleep diaries correlated well.  - Average total sleep time of 7 hours, ranging from 5.5-10.5 hours.  - Average in bed time of 11:13pm  - Average latency was typically < 10 minutes and often 0 minutes.  - Average out of bed time of 7:20am.  - No daytime naps noted and no prolonged middle of the night awakenings apparent.         MSLT/MWT REPORT          Patient Name: SUSAN THOMAS Study Date: 9/27/2021   YOB: 1995 Study Type: MSLT   Age:  25 year MRN: [id number]   Sex: Female Interp Physician: [email or web address]   BMI:  25.3 Ordering Physician: Sachin Wei MD   Height: 5' 8\" Referring Physician: -   Weight: 165.0 lbs Recording Tech: YEE Do   [place]: 17 Neck Size (cm): 34 Scoring Tech: YEE Do   Nap Summary       Nap 1 Nap 2 Nap 3 Nap 4 Nap 5 Average   Lights Off 08:35:57 AM 10:30:25 AM 12:23:50 PM 02:26:18 PM - -   Lights On 09:09:15 AM 10:50:45 AM 12:44:14 PM 02:44:15 PM - -   Time In Bed 33.3 20.3 20.4 17.9 - 23.0   Sleep Time 15.5 15.5 11.4 15.0 - 14.3   Sleep Efficiency 46.4% 76.1% 56.1% 83.4% - 62.3%   Sleep Onset 08:53:47 AM 10:35:17 AM 12:32:47 PM 02:29:17 PM - -   Sleep Latency 17.8 4.9 8.9 3.0 - 8.7   REM Onset - - - - - -   REM Sleep Onset Latency - - - - - -         Recording / " "Sleep Tech Comments    This MSLT was recorded after overnight polysomnography -- Lights On at 0700 (TRT = 515.1 min, TST = 438 min).  Patient was monitored all day and demonstrated compliance with all technologist s instructions.  Sleep onset detected in study periods 1, 2, 3,  and 4.  SOREMPs were not observed in any study periods.  Actiwatch was collected and downloaded.  Random UA was collected.    Physician Interpretation    Overall, mean sleep latency on 4 naps was borderline at 8.7 minutes.  If first nap is excluded, mean sleep latency on remaining naps was 5.6 minutes.  No SOREM s observed.  Urine toxicology was negative.    _____________________________________   Electronically Signed By: Sachin Wei MD (10/1/2021)         Past medical history:    There are no problems to display for this patient.      10 point ROS of systems including Constitutional, Eyes, Respiratory, Cardiovascular, Gastroenterology, Genitourinary, Integumentary, Muscularskeletal, Psychiatric were all negative except for pertinent positives noted in my HPI.    Current Outpatient Medications   Medication Sig Dispense Refill     escitalopram (LEXAPRO) 10 MG tablet Take 10 mg by mouth daily       etonogestrel-ethinyl estradiol (NUVARING) 0.12-0.015 MG/24HR vaginal ring Insert vaginally and leave in place for 3 consecutive weeks, then remove for 1 week.         OBJECTIVE:  Ht 1.74 m (5' 8.5\")   Wt 72.6 kg (160 lb)   BMI 23.97 kg/m      Physical Exam     ---  This note was written with the assistance of the Dragon voice-dictation technology software. The final document, although reviewed, may contain errors. For corrections, please contact the office.    Sachin Wei MD    Sleep Medicine  [facility]  ([phone])  [facility]  ([phone])    "

## 2021-11-12 ENCOUNTER — VIRTUAL VISIT (OUTPATIENT)
Dept: SLEEP MEDICINE | Facility: CLINIC | Age: 26
End: 2021-11-12
Payer: COMMERCIAL

## 2021-11-12 DIAGNOSIS — G47.419 NARCOLEPSY WITHOUT CATAPLEXY(347.00): Primary | ICD-10-CM

## 2021-11-12 PROCEDURE — 99214 OFFICE O/P EST MOD 30 MIN: CPT | Mod: 95 | Performed by: FAMILY MEDICINE

## 2021-11-12 RX ORDER — METHYLPHENIDATE HYDROCHLORIDE 10 MG/1
TABLET ORAL
Qty: 90 TABLET | Refills: 0 | Status: SHIPPED | OUTPATIENT
Start: 2021-11-12 | End: 2022-05-10

## 2022-04-15 ENCOUNTER — TRANSFERRED RECORDS (OUTPATIENT)
Dept: HEALTH INFORMATION MANAGEMENT | Facility: CLINIC | Age: 27
End: 2022-04-15
Payer: COMMERCIAL

## 2022-04-15 ENCOUNTER — MEDICAL CORRESPONDENCE (OUTPATIENT)
Dept: HEALTH INFORMATION MANAGEMENT | Facility: CLINIC | Age: 27
End: 2022-04-15
Payer: COMMERCIAL

## 2022-04-20 ENCOUNTER — TRANSCRIBE ORDERS (OUTPATIENT)
Dept: OTHER | Age: 27
End: 2022-04-20
Payer: COMMERCIAL

## 2022-04-20 DIAGNOSIS — L60.8 TOENAIL DEFORMITY: ICD-10-CM

## 2022-04-20 DIAGNOSIS — M79.676 PAIN AROUND TOENAIL: Primary | ICD-10-CM

## 2022-05-09 DIAGNOSIS — G47.419 NARCOLEPSY WITHOUT CATAPLEXY(347.00): ICD-10-CM

## 2022-05-09 NOTE — TELEPHONE ENCOUNTER
Pending Prescriptions:                       Disp   Refills    methylphenidate (RITALIN) 10 MG tablet    90 tab*0            Sig: Take 1/2 - 1 tablet by mouth two to three times           daily as needed for sleepiness.    Last Written Prescription Date:  11/12/21  Last Fill Quantity: 90,   # refills: 0  Last Office Visit with [facility], [facility] or [facility] prescribing provider: 11/12/21  Future Office visit:   No follow up scheduled at this time.    SIVAN Rizo

## 2022-05-10 RX ORDER — METHYLPHENIDATE HYDROCHLORIDE 10 MG/1
TABLET ORAL
Qty: 90 TABLET | Refills: 0 | Status: SHIPPED | OUTPATIENT
Start: 2022-07-10 | End: 2023-06-26

## 2022-05-10 RX ORDER — METHYLPHENIDATE HYDROCHLORIDE 10 MG/1
TABLET ORAL
Qty: 90 TABLET | Refills: 0 | Status: SHIPPED | OUTPATIENT
Start: 2022-05-10 | End: 2022-08-11

## 2022-05-10 RX ORDER — METHYLPHENIDATE HYDROCHLORIDE 10 MG/1
TABLET ORAL
Qty: 90 TABLET | Refills: 0 | Status: SHIPPED | OUTPATIENT
Start: 2022-06-10 | End: 2023-06-26

## 2022-05-13 ENCOUNTER — OFFICE VISIT (OUTPATIENT)
Dept: PODIATRY | Facility: CLINIC | Age: 27
End: 2022-05-13
Attending: PHYSICIAN ASSISTANT
Payer: COMMERCIAL

## 2022-05-13 DIAGNOSIS — L60.1 ONYCHOLYSIS: Primary | ICD-10-CM

## 2022-05-13 PROCEDURE — 11730 AVULSION NAIL PLATE SIMPLE 1: CPT | Mod: TA | Performed by: PODIATRIST

## 2022-05-13 PROCEDURE — 99203 OFFICE O/P NEW LOW 30 MIN: CPT | Mod: 25 | Performed by: PODIATRIST

## 2022-05-13 RX ORDER — LIDOCAINE HYDROCHLORIDE 20 MG/ML
6 INJECTION, SOLUTION INFILTRATION; PERINEURAL ONCE
Status: COMPLETED | OUTPATIENT
Start: 2022-05-13 | End: 2022-05-13

## 2022-05-13 RX ORDER — BUPROPION HYDROCHLORIDE 150 MG/1
TABLET ORAL
COMMUNITY
Start: 2022-04-25

## 2022-05-13 RX ADMIN — LIDOCAINE HYDROCHLORIDE 6 ML: 20 INJECTION, SOLUTION INFILTRATION; PERINEURAL at 15:39

## 2022-05-13 NOTE — PROGRESS NOTES
FOOT AND ANKLE SURGERY/PODIATRY CONSULT NOTE         ASSESSMENT: Onycholysis       TREATMENT:  -I discussed with the patient that the distal central aspect of the previous nail plate is free from the nail bed, while the lateral margin is adhered to the nail plate which is likely contributing to her symptoms.     -We discussed treatment options to include continued monitoring vs removing the distal nail plate. She would like to remove the distal nail plate today.     -Injected 5 ml 1% lidocaine plain left hallux. The distal nail plate was loosened with a freer elevator, and removed with a hemostat. The proximal nail plate is intact and distal nail bed also is intact. Gauze dressing was applied.     -Patient's questions invited and answered. Patient to return to clinic in 1 week(s) for re-evaluation. She was encouraged to call my office with any further questions or concerns.     Wenceslao Moura DPM  [facility] Podiatry/Foot & Ankle Surgery      HPI: I was asked to see Rubi Gonsales today for nail concerns on her left hallux. The patient states she lost her hallux nails over the winter months while ice climbing. She has noticed mild pain in the left hallux nail plate while climbing. Denies any additional trauma.       Past Medical History:   Diagnosis Date     Anxiety      Depression          Social History     Socioeconomic History     Marital status: Single     Spouse name: Not on file     Number of children: Not on file     Years of education: Not on file     Highest education level: Not on file   Occupational History     Not on file   Tobacco Use     Smoking status: Never Smoker     Smokeless tobacco: Never Used   Substance and Sexual Activity     Alcohol use: Yes     Drug use: Never     Sexual activity: Yes     Partners: Male     Birth control/protection: Inserts/Ring   Other Topics Concern     Parent/sibling w/ CABG, MI or angioplasty before 65F 55M? Not Asked   Social History Narrative     Not on  file     Social Determinants of Health     Financial Resource Strain: Not on file   Food Insecurity: Not on file   Transportation Needs: Not on file   Physical Activity: Not on file   Stress: Not on file   Social Connections: Not on file   Intimate Partner Violence: Not on file   Housing Stability: Not on file            Allergies   Allergen Reactions     Cephalosporins Rash     Penicillins Rash         MEDICATIONS:   Current Outpatient Medications   Medication     buPROPion (WELLBUTRIN XL) 150 MG 24 hr tablet     escitalopram (LEXAPRO) 10 MG tablet     etonogestrel-ethinyl estradiol (NUVARING) 0.12-0.015 MG/24HR vaginal ring     methylphenidate (RITALIN) 10 MG tablet     [START ON 6/10/2022] methylphenidate (RITALIN) 10 MG tablet     [START ON 7/10/2022] methylphenidate (RITALIN) 10 MG tablet     No current facility-administered medications for this visit.        Family History   Problem Relation Age of Onset     No Known Problems Mother      No Known Problems Father      No Known Problems Sister      Hypertension Brother      Hypertension Maternal Grandmother      No Known Problems Maternal Grandfather      No Known Problems Paternal Grandmother      No Known Problems Other           Review of Systems - 10 point Review of Systems is negative except for nail plate concerns left hallux which is noted in HPI.    OBJECTIVE:  Appearance: alert, well appearing, and in no distress.    VITAL SIGNS: There were no vitals taken for this visit.      General appearance: Patient is alert and fully cooperative with history & exam.  No sign of distress is noted during the visit.     Psychiatric: Affect is pleasant & appropriate.  Patient appears motivated to improve health.     Respiratory: Breathing is regular & unlabored while sitting.     HEENT: Hearing is intact to spoken word.  Speech is clear.  No gross evidence of visual impairment that would impact ambulation.      Vascular: Dorsalis pedis and posterior tibial pulses are  palpable. There is pedal hair growth bilateral.  CFT < 3 sec from anterior tibial surface to distal digits bilateral. There is no appreciable edema noted.  Dermatologic: Distal old nail plate left hallux is free from the nail bed centrally, but adhered along the lateral margin.   Neurologic: All epicritic and proprioceptive sensations are grossly intact bilateral.  Musculoskeletal: No pain bilateral hallux.

## 2022-05-13 NOTE — PATIENT INSTRUCTIONS
Post Nail Excision Instructions    Keep bandages clean, dry, and intact for the rest of the day of surgery.   The day after surgery, remove all bandages  Soak foot in warm water with Epsom Salt for 10 minutes  Dry feet thoroughly   Apply a Band-Aid to the affected area  Continue this process daily for the next two weeks following the procedure  General bathing does not replace daily foot soaks  Do not anticipate severe pain. But if you do experience some discomfort, you can take Ibuprofen (Advil)  You can expect some drainage and weeping from the area. This may last anywhere from several days to several weeks. This is NORMAL.  If you experience any increase in pain, any swelling, or odor call our office immediately. As this may be a sign of infection.    If you have any questions in the meantime, please do not hesitate to call Podiatry at [phone]

## 2022-05-13 NOTE — LETTER
5/13/2022         RE: Rubi Gonsales  [address]  [place] [address]        Dear Colleague,    Thank you for referring your patient, Rubi Gonsales, to the [facility]. Please see a copy of my visit note below.          FOOT AND ANKLE SURGERY/PODIATRY CONSULT NOTE         ASSESSMENT: Onycholysis       TREATMENT:  -I discussed with the patient that the distal central aspect of the previous nail plate is free from the nail bed, while the lateral margin is adhered to the nail plate which is likely contributing to her symptoms.     -We discussed treatment options to include continued monitoring vs removing the distal nail plate. She would like to remove the distal nail plate today.     -Injected 5 ml 1% lidocaine plain left hallux. The distal nail plate was loosened with a freer elevator, and removed with a hemostat. The proximal nail plate is intact and distal nail bed also is intact. Gauze dressing was applied.     -Patient's questions invited and answered. Patient to return to clinic in 1 week(s) for re-evaluation. She was encouraged to call my office with any further questions or concerns.     Wenceslao Moura DPM  [facility] Podiatry/Foot & Ankle Surgery      HPI: I was asked to see Rubi Gonsales today for nail concerns on her left hallux. The patient states she lost her hallux nails over the winter months while ice climbing. She has noticed mild pain in the left hallux nail plate while climbing. Denies any additional trauma.       Past Medical History:   Diagnosis Date     Anxiety      Depression          Social History     Socioeconomic History     Marital status: Single     Spouse name: Not on file     Number of children: Not on file     Years of education: Not on file     Highest education level: Not on file   Occupational History     Not on file   Tobacco Use     Smoking status: Never Smoker     Smokeless tobacco: Never Used   Substance and Sexual Activity      Alcohol use: Yes     Drug use: Never     Sexual activity: Yes     Partners: Male     Birth control/protection: Inserts/Ring   Other Topics Concern     Parent/sibling w/ CABG, MI or angioplasty before 65F 55M? Not Asked   Social History Narrative     Not on file     Social Determinants of Health     Financial Resource Strain: Not on file   Food Insecurity: Not on file   Transportation Needs: Not on file   Physical Activity: Not on file   Stress: Not on file   Social Connections: Not on file   Intimate Partner Violence: Not on file   Housing Stability: Not on file            Allergies   Allergen Reactions     Cephalosporins Rash     Penicillins Rash         MEDICATIONS:   Current Outpatient Medications   Medication     buPROPion (WELLBUTRIN XL) 150 MG 24 hr tablet     escitalopram (LEXAPRO) 10 MG tablet     etonogestrel-ethinyl estradiol (NUVARING) 0.12-0.015 MG/24HR vaginal ring     methylphenidate (RITALIN) 10 MG tablet     [START ON 6/10/2022] methylphenidate (RITALIN) 10 MG tablet     [START ON 7/10/2022] methylphenidate (RITALIN) 10 MG tablet     No current facility-administered medications for this visit.        Family History   Problem Relation Age of Onset     No Known Problems Mother      No Known Problems Father      No Known Problems Sister      Hypertension Brother      Hypertension Maternal Grandmother      No Known Problems Maternal Grandfather      No Known Problems Paternal Grandmother      No Known Problems Other           Review of Systems - 10 point Review of Systems is negative except for nail plate concerns left hallux which is noted in HPI.    OBJECTIVE:  Appearance: alert, well appearing, and in no distress.    VITAL SIGNS: There were no vitals taken for this visit.      General appearance: Patient is alert and fully cooperative with history & exam.  No sign of distress is noted during the visit.     Psychiatric: Affect is pleasant & appropriate.  Patient appears motivated to improve health.      Respiratory: Breathing is regular & unlabored while sitting.     HEENT: Hearing is intact to spoken word.  Speech is clear.  No gross evidence of visual impairment that would impact ambulation.      Vascular: Dorsalis pedis and posterior tibial pulses are palpable. There is pedal hair growth bilateral.  CFT < 3 sec from anterior tibial surface to distal digits bilateral. There is no appreciable edema noted.  Dermatologic: Distal old nail plate left hallux is free from the nail bed centrally, but adhered along the lateral margin.   Neurologic: All epicritic and proprioceptive sensations are grossly intact bilateral.  Musculoskeletal: No pain bilateral hallux.             Again, thank you for allowing me to participate in the care of your patient.        Sincerely,        Wenceslao Moura DPM

## 2022-05-20 ENCOUNTER — VIRTUAL VISIT (OUTPATIENT)
Dept: PODIATRY | Facility: CLINIC | Age: 27
End: 2022-05-20
Payer: COMMERCIAL

## 2022-05-20 DIAGNOSIS — L60.1 ONYCHOLYSIS: Primary | ICD-10-CM

## 2022-05-20 PROCEDURE — 99024 POSTOP FOLLOW-UP VISIT: CPT | Performed by: PODIATRIST

## 2022-05-20 NOTE — LETTER
5/20/2022         RE: Rubi Gonsales  [address]  [place] [address]        Dear Colleague,    Thank you for referring your patient, Rubi Gonsales, to the [facility]. Please see a copy of my visit note below.    Podiatry Progress Note        ASSESSMENT: S/P Nail avulsion left hallux    Video visit: 2:33/2:35      TREATMENT:  -Surgical site on the left hallux is progressing well. I recommend she continue to monitor for concerns with growth of the new nail.   -The patient is discharged at this time, but encouraged to return as symptoms dictate.     Wenceslao Moura, DANISH  [facility] Podiatry/Foot & Ankle Surgery      HPI: Rubi Gonsales was seen again today s/p nail avulsion on the left hallux. She is doing well, denies toe pain.    Past Medical History:   Diagnosis Date     Anxiety      Depression        Allergies   Allergen Reactions     Cephalosporins Rash     Penicillins Rash         Current Outpatient Medications:      buPROPion (WELLBUTRIN XL) 150 MG 24 hr tablet, TAKE ONE TABLET BY MOUTH EACH MORNING., Disp: , Rfl:      escitalopram (LEXAPRO) 10 MG tablet, Take 10 mg by mouth daily, Disp: , Rfl:      etonogestrel-ethinyl estradiol (NUVARING) 0.12-0.015 MG/24HR vaginal ring, Insert vaginally and leave in place for 3 consecutive weeks, then remove for 1 week., Disp: , Rfl:      methylphenidate (RITALIN) 10 MG tablet, Take 1/2 - 1 tablet by mouth two to three times daily as needed for sleepiness., Disp: 90 tablet, Rfl: 0     [START ON 6/10/2022] methylphenidate (RITALIN) 10 MG tablet, Take 1/2 - 1 tablet by mouth two to three times daily as needed for sleepiness., Disp: 90 tablet, Rfl: 0     [START ON 7/10/2022] methylphenidate (RITALIN) 10 MG tablet, Take 1/2 - 1 tablet by mouth two to three times daily as needed for sleepiness., Disp: 90 tablet, Rfl: 0    Review of Systems - Negative      OBJECTIVE:  Appearance: alert, well appearing, and in no distress.    There  were no vitals taken for this visit.          Again, thank you for allowing me to participate in the care of your patient.        Sincerely,        Wenceslao Moura DPM

## 2022-05-20 NOTE — PROGRESS NOTES
Podiatry Progress Note        ASSESSMENT: S/P Nail avulsion left hallux    Video visit: 2:33/2:35      TREATMENT:  -Surgical site on the left hallux is progressing well. I recommend she continue to monitor for concerns with growth of the new nail.   -The patient is discharged at this time, but encouraged to return as symptoms dictate.     Wenceslao Moura DPM  [facility] Podiatry/Foot & Ankle Surgery      HPI: Rubi Gonsales was seen again today s/p nail avulsion on the left hallux. She is doing well, denies toe pain.    Past Medical History:   Diagnosis Date     Anxiety      Depression        Allergies   Allergen Reactions     Cephalosporins Rash     Penicillins Rash         Current Outpatient Medications:      buPROPion (WELLBUTRIN XL) 150 MG 24 hr tablet, TAKE ONE TABLET BY MOUTH EACH MORNING., Disp: , Rfl:      escitalopram (LEXAPRO) 10 MG tablet, Take 10 mg by mouth daily, Disp: , Rfl:      etonogestrel-ethinyl estradiol (NUVARING) 0.12-0.015 MG/24HR vaginal ring, Insert vaginally and leave in place for 3 consecutive weeks, then remove for 1 week., Disp: , Rfl:      methylphenidate (RITALIN) 10 MG tablet, Take 1/2 - 1 tablet by mouth two to three times daily as needed for sleepiness., Disp: 90 tablet, Rfl: 0     [START ON 6/10/2022] methylphenidate (RITALIN) 10 MG tablet, Take 1/2 - 1 tablet by mouth two to three times daily as needed for sleepiness., Disp: 90 tablet, Rfl: 0     [START ON 7/10/2022] methylphenidate (RITALIN) 10 MG tablet, Take 1/2 - 1 tablet by mouth two to three times daily as needed for sleepiness., Disp: 90 tablet, Rfl: 0    Review of Systems - Negative      OBJECTIVE:  Appearance: alert, well appearing, and in no distress.    There were no vitals taken for this visit.

## 2022-05-21 ENCOUNTER — HEALTH MAINTENANCE LETTER (OUTPATIENT)
Age: 27
End: 2022-05-21

## 2022-08-11 ENCOUNTER — MYC REFILL (OUTPATIENT)
Dept: SLEEP MEDICINE | Facility: CLINIC | Age: 27
End: 2022-08-11

## 2022-08-11 DIAGNOSIS — G47.419 NARCOLEPSY WITHOUT CATAPLEXY(347.00): ICD-10-CM

## 2022-08-11 NOTE — TELEPHONE ENCOUNTER
Pended rx methylphenidate (ritalin) 10 mg tabs, disp 90 tabs, r^0    mychart refill request      Last Written Prescription Date: 7/10/2022  Last Fill Quantity:90   # refills: 0  Last Office Visit: November 2021   Future Office visit:       Routing refill request to provider for review/approval because:  Drug not on the [facility] or [facility] refill protocol or controlled substance    Routed to Dr. Wei for review.

## 2022-08-12 RX ORDER — METHYLPHENIDATE HYDROCHLORIDE 10 MG/1
TABLET ORAL
Qty: 90 TABLET | Refills: 0 | Status: SHIPPED | OUTPATIENT
Start: 2022-10-10 | End: 2023-06-26

## 2022-08-12 RX ORDER — METHYLPHENIDATE HYDROCHLORIDE 10 MG/1
TABLET ORAL
Qty: 90 TABLET | Refills: 0 | Status: SHIPPED | OUTPATIENT
Start: 2022-09-10 | End: 2023-06-26

## 2022-08-12 RX ORDER — METHYLPHENIDATE HYDROCHLORIDE 10 MG/1
TABLET ORAL
Qty: 90 TABLET | Refills: 0 | Status: SHIPPED | OUTPATIENT
Start: 2022-08-12 | End: 2024-02-09

## 2022-09-18 ENCOUNTER — HEALTH MAINTENANCE LETTER (OUTPATIENT)
Age: 27
End: 2022-09-18

## 2022-12-16 ENCOUNTER — HOSPITAL ENCOUNTER (OUTPATIENT)
Dept: ULTRASOUND IMAGING | Facility: HOSPITAL | Age: 27
Discharge: HOME | End: 2022-12-16
Attending: NURSE PRACTITIONER | Admitting: NURSE PRACTITIONER
Payer: COMMERCIAL

## 2022-12-16 DIAGNOSIS — N93.9 ABNORMAL UTERINE BLEEDING: ICD-10-CM

## 2022-12-16 PROCEDURE — 76830 TRANSVAGINAL US NON-OB: CPT

## 2023-02-06 ENCOUNTER — MEDICAL CORRESPONDENCE (OUTPATIENT)
Dept: HEALTH INFORMATION MANAGEMENT | Facility: CLINIC | Age: 28
End: 2023-02-06
Payer: COMMERCIAL

## 2023-02-06 ENCOUNTER — TRANSFERRED RECORDS (OUTPATIENT)
Dept: HEALTH INFORMATION MANAGEMENT | Facility: CLINIC | Age: 28
End: 2023-02-06
Payer: COMMERCIAL

## 2023-02-09 ENCOUNTER — TRANSCRIBE ORDERS (OUTPATIENT)
Dept: OTHER | Age: 28
End: 2023-02-09

## 2023-02-09 DIAGNOSIS — J32.9 RECURRENT SINUSITIS: Primary | ICD-10-CM

## 2023-02-09 DIAGNOSIS — R06.89 BREATHING DIFFICULTY: ICD-10-CM

## 2023-02-10 NOTE — TELEPHONE ENCOUNTER
FUTURE VISIT INFORMATION      FUTURE VISIT INFORMATION:    Date: 5/24/23    Time: 2pm    [place] [facility]  [place] INFORMATION:    Referring provider:  Gabino Dueñas    Referring providers clinic:   [facility]    Reason for visit/diagnosis  Per Pt, dx sinusitis, referral from Gabino Dueñas @ [facility], recs in [facility]    RECORDS REQUESTED FROM:       Clinic name Comments Records Status Imaging Status    [facility]  4/5/23, 2/6/23- note with Gabino Dueñas SCANNED IN Epic

## 2023-03-21 ENCOUNTER — TRANSFERRED RECORDS (OUTPATIENT)
Dept: HEALTH INFORMATION MANAGEMENT | Facility: CLINIC | Age: 28
End: 2023-03-21
Payer: COMMERCIAL

## 2023-04-05 ENCOUNTER — MEDICAL CORRESPONDENCE (OUTPATIENT)
Dept: HEALTH INFORMATION MANAGEMENT | Facility: CLINIC | Age: 28
End: 2023-04-05
Payer: COMMERCIAL

## 2023-04-05 ENCOUNTER — TRANSFERRED RECORDS (OUTPATIENT)
Dept: HEALTH INFORMATION MANAGEMENT | Facility: CLINIC | Age: 28
End: 2023-04-05
Payer: COMMERCIAL

## 2023-04-07 ENCOUNTER — TRANSCRIBE ORDERS (OUTPATIENT)
Dept: OTHER | Age: 28
End: 2023-04-07

## 2023-04-07 DIAGNOSIS — N92.6 IRREGULAR PERIODS: Primary | ICD-10-CM

## 2023-05-22 NOTE — PROGRESS NOTES
[facility]  New Patient Visit      Encounter date:   May 25, 2023    Referring Provider:   [place] [address]    Chief Complaint: Recurrent sinusitis    History of Present Illness:   Rubi Gonsales is a 27 year old female who presents for consultation regarding recurrent sinusitis.     She reports history of recurrent sinus infections up until her mid 20s with treatment a couple time annually. She then began to experience increased congestion and 'thickening' of the sinuses.  She is currently on Flonase and Claritin after symptoms started in December. She avoids use of Afrin but notices immediate improvement in the clarity of her breathing with use. No history of allergies, sneezing, or itchy facial symptoms.     Sino-Nasal Outcome Test (SNOT - 22)  DNT    [place] [facility] History:  No sinonasal surgery    Review of systems: A 14-point review of systems has been conducted and was negative for any notable symptoms, except as dictated in the history of present illness.     Medical History:  Past Medical History:   Diagnosis Date     Anxiety      Depression         Surgical History:   Past Surgical History:   Procedure Laterality Date     NO HISTORY OF SURGERY          Family History:  Family History   Problem Relation Age of Onset     No Known Problems Mother      No Known Problems Father      No Known Problems Sister      Hypertension Brother      Hypertension Maternal Grandmother      No Known Problems Maternal Grandfather      No Known Problems Paternal Grandmother      No Known Problems Other         Social History:   Social History     Socioeconomic History     Marital status: Single   Tobacco Use     Smoking status: Never     Smokeless tobacco: Never   Substance and Sexual Activity     Alcohol use: Yes     Drug use: Never     Sexual activity: Yes     Partners: Male     Birth control/protection: Inserts/Ring        Physical  "Exam:  Vital signs: /70 (BP Location: Right arm, Patient Position: Sitting, Cuff Size: Adult Regular)   Pulse 65   Ht 1.753 m (5' 9\")   Wt 77.1 kg (170 lb)   SpO2 100%   BMI 25.10 kg/m     General Appearance: No acute distress, appropriate demeanor, conversant  Eyes: moist conjunctivae; EOMI; pupils symmetric; visual acuity grossly intact; no proptosis  Head: normocephalic; overall symmetric appearance without deformity  Face: overall symmetric without deformity; HB I/VI  Ears: Normal appearance of external ear; external meatus normal in appearance; TMs intact without perforation bilaterally;   Nose: No external deformity; see endoscopy   Oral Cavity/oropharynx: Normal appearance of mucosa; tongue midline; no mass or lesions; tonsils (2+); oropharynx without obvious mucosal abnormality  Neck: no palpable lymphadenopathy; thyroid without palpable nodules  Lungs: symmetric chest rise; no wheezing  CV: Good distal perfusion; normal hear rate  Extremities: No deformity  Neurologic Exam: Cranial nerves II-XII are grossly intact; no focal deficit    Procedure Note  Procedure performed: Rigid nasal endoscopy  Indication: To evaluate for sinonasal pathology not visualized on routine anterior rhinoscopy  Anesthesia: 4% topical lidocaine with 0.05% oxymetazoline  Description of procedure: A 30 degree, 3 mm rigid endoscope was inserted into bilateral nasal cavities and the nasal valves, nasal cavity, middle meatus, sphenoethmoid recess, and nasopharynx were thoroughly evaluated for evidence of obstruction, edema, purulence, polyps and/or mass/lesion.     Nelly-Roc Endoscopic Scoring System  Endoscopic observation Right Left   Polyps in middle meatus (0 = absent, 1 = restricted to middle meatus, 2 = Beyond middle meatus) 0 0   Discharge (0 = absent, 1 = thin and clear, 2 = thick, purulent) 0 0   Edema (0 = absent, 1 = mild-moderate, 2 = moderate-severe) 0 0   Crusting (0 = absent, 1 = mild-moderate, 2 = " moderate-severe) 0 0   Scarring (0= absent, 1 = mild-moderate, 2 = moderate-severe) 0 0   Total 0 0     Findings  RT: MM and SER clear; IT hypertrophy  LT: MM and SER clear; IT hypertrophy; Septal deviation    The patient tolerated the procedure well without complication.     Laboratory Review:  n/a    Imaging Review:  n/a    Pathology Review:  n/a    Assessment/Medical Decision Making:  Nasal congestion secondary to:  IT hypertrophy  Septal deviation    Plan:  Bilateral endoscopy performed today shows no fer or smoldering sinusitis. Symptoms likely due to IT hypertrophy and slight septal deviation.  I discussed continuing Flonase for medical management.  We also discussed turbinate reduction, both-office and submucous resection under general anesthesia.   The benefits and perceived risks associated with each approach were discussed. She will reach out to us via [employer] for follow-up or if she would like to proceed with surgery.     Clark Sykes MD    [facility]  Rhinology  Endoscopic Skull Base Surgery  [facility]  Department of Otolaryngology - Head & Neck Surgery    Scribe Disclosure:  I, Dontae Carrera, am serving as a scribe to document services personally performed by Clark Sykes MD at this visit, based upon the provider's statements to me. All documentation has been reviewed by the aforementioned provider prior to being entered into the official medical record.

## 2023-05-24 ENCOUNTER — OFFICE VISIT (OUTPATIENT)
Dept: OTOLARYNGOLOGY | Facility: CLINIC | Age: 28
End: 2023-05-24
Payer: COMMERCIAL

## 2023-05-24 ENCOUNTER — PRE VISIT (OUTPATIENT)
Dept: OTOLARYNGOLOGY | Facility: CLINIC | Age: 28
End: 2023-05-24

## 2023-05-24 VITALS
WEIGHT: 170 LBS | SYSTOLIC BLOOD PRESSURE: 110 MMHG | BODY MASS INDEX: 25.18 KG/M2 | HEIGHT: 69 IN | DIASTOLIC BLOOD PRESSURE: 70 MMHG | HEART RATE: 65 BPM | OXYGEN SATURATION: 100 %

## 2023-05-24 DIAGNOSIS — J34.2 DEVIATED NASAL SEPTUM: ICD-10-CM

## 2023-05-24 DIAGNOSIS — R09.81 NASAL CONGESTION: ICD-10-CM

## 2023-05-24 DIAGNOSIS — J34.3 HYPERTROPHY OF INFERIOR NASAL TURBINATE: ICD-10-CM

## 2023-05-24 DIAGNOSIS — J32.9 RECURRENT SINUSITIS: Primary | ICD-10-CM

## 2023-05-24 DIAGNOSIS — R06.89 BREATHING DIFFICULTY: ICD-10-CM

## 2023-05-24 PROCEDURE — 99203 OFFICE O/P NEW LOW 30 MIN: CPT | Mod: 25 | Performed by: OTOLARYNGOLOGY

## 2023-05-24 PROCEDURE — 31231 NASAL ENDOSCOPY DX: CPT | Performed by: OTOLARYNGOLOGY

## 2023-05-24 RX ORDER — BUSPIRONE HYDROCHLORIDE 10 MG/1
TABLET ORAL
COMMUNITY

## 2023-05-24 RX ORDER — SPIRONOLACTONE 100 MG/1
1 TABLET, FILM COATED ORAL
COMMUNITY
Start: 2022-12-01

## 2023-05-24 RX ORDER — TRIAMCINOLONE ACETONIDE 1 MG/G
OINTMENT TOPICAL
COMMUNITY
End: 2023-06-29

## 2023-05-24 RX ORDER — FEXOFENADINE HCL 180 MG/1
TABLET ORAL
COMMUNITY

## 2023-05-24 RX ORDER — HYDROXYZINE HYDROCHLORIDE 25 MG/1
TABLET, FILM COATED ORAL
COMMUNITY
Start: 2023-03-23

## 2023-05-24 RX ORDER — CLINDAMYCIN PHOSPHATE 10 UG/ML
LOTION TOPICAL
COMMUNITY
Start: 2023-01-25

## 2023-05-24 RX ORDER — TAZAROTENE 1 MG/G
CREAM TOPICAL
COMMUNITY
Start: 2023-01-25

## 2023-05-24 RX ORDER — AZELAIC ACID 0.15 G/G
GEL TOPICAL
COMMUNITY
Start: 2023-04-26

## 2023-05-24 RX ORDER — FLUCONAZOLE 150 MG/1
TABLET ORAL
COMMUNITY
Start: 2023-05-12 | End: 2023-08-31

## 2023-05-24 ASSESSMENT — PAIN SCALES - GENERAL: PAINLEVEL: NO PAIN (0)

## 2023-05-24 NOTE — PATIENT INSTRUCTIONS
You were seen in the ENT Clinic today by Dr. Sykes. If you have any questions or concerns after your appointment, please contact us (see below)       2.   The following recommendations have been made based upon your appointment today:   -Please review handouts. If you decide you would like to schedule septoplasty and turbinate reduction surgery, please reach out to us.             How to Contact Us:  Send a [employer] message to your provider. Our team will respond to you via [employer]. Occasionally, we will need to call you to get further information.  For urgent matters (Monday-Friday), call the ENT Clinic: [phone] and speak with a call center team member - they will route your call appropriately.   If you'd like to speak directly with a nurse, please find our contact information below. We do our best to check voicemail frequently throughout the day, and will work to call you back within 1-2 days. For urgent matters, please use the general clinic phone numbers listed above.        Randa DIAZ RN  ENT RN Care Coordinator  Direct: [phone]  Pat GREEN LPN  Direct: [phone]         [facility]  Department of Otolaryngology

## 2023-05-24 NOTE — LETTER
5/24/2023       RE: Rubi Gonsales  [address]  [place] [address]     Dear Colleague,    Thank you for referring your patient, Rubi Gonsales, to the [facility] [place] [facility]. Please see a copy of my visit note below.      [facility]  New Patient Visit      Encounter date:   May 25, 2023    Referring Provider:   Gabino Dueñas MD  [address] [address]    Chief Complaint: Recurrent sinusitis    History of Present Illness:   Rubi Gonsales is a 27 year old female who presents for consultation regarding recurrent sinusitis.     She reports history of recurrent sinus infections up until her mid 20s with treatment a couple time annually. She then began to experience increased congestion and 'thickening' of the sinuses.  She is currently on Flonase and Claritin after symptoms started in December. She avoids use of Afrin but notices immediate improvement in the clarity of her breathing with use. No history of allergies, sneezing, or itchy facial symptoms.     Sino-Nasal Outcome Test (SNOT - 22)  DNT    [place] Operative History:  No sinonasal surgery    Review of systems: A 14-point review of systems has been conducted and was negative for any notable symptoms, except as dictated in the history of present illness.     Medical History:  Past Medical History:   Diagnosis Date     Anxiety      Depression         Surgical History:   Past Surgical History:   Procedure Laterality Date     NO HISTORY OF SURGERY          Family History:  Family History   Problem Relation Age of Onset     No Known Problems Mother      No Known Problems Father      No Known Problems Sister      Hypertension Brother      Hypertension Maternal Grandmother      No Known Problems Maternal Grandfather      No Known Problems Paternal Grandmother      No Known Problems Other         Social  "History:   Social History     Socioeconomic History     Marital status: Single   Tobacco Use     Smoking status: Never     Smokeless tobacco: Never   Substance and Sexual Activity     Alcohol use: Yes     Drug use: Never     Sexual activity: Yes     Partners: Male     Birth control/protection: Inserts/Ring        Physical Exam:  Vital signs: /70 (BP Location: Right arm, Patient Position: Sitting, Cuff Size: Adult Regular)   Pulse 65   Ht 1.753 m (5' 9\")   Wt 77.1 kg (170 lb)   SpO2 100%   BMI 25.10 kg/m     General Appearance: No acute distress, appropriate demeanor, conversant  Eyes: moist conjunctivae; EOMI; pupils symmetric; visual acuity grossly intact; no proptosis  Head: normocephalic; overall symmetric appearance without deformity  Face: overall symmetric without deformity; HB I/VI  Ears: Normal appearance of external ear; external meatus normal in appearance; TMs intact without perforation bilaterally;   Nose: No external deformity; see endoscopy   Oral Cavity/oropharynx: Normal appearance of mucosa; tongue midline; no mass or lesions; tonsils (2+); oropharynx without obvious mucosal abnormality  Neck: no palpable lymphadenopathy; thyroid without palpable nodules  Lungs: symmetric chest rise; no wheezing  CV: Good distal perfusion; normal hear rate  Extremities: No deformity  Neurologic Exam: Cranial nerves II-XII are grossly intact; no focal deficit    Procedure Note  Procedure performed: Rigid nasal endoscopy  Indication: To evaluate for sinonasal pathology not visualized on routine anterior rhinoscopy  Anesthesia: 4% topical lidocaine with 0.05% oxymetazoline  Description of procedure: A 30 degree, 3 mm rigid endoscope was inserted into bilateral nasal cavities and the nasal valves, nasal cavity, middle meatus, sphenoethmoid recess, and nasopharynx were thoroughly evaluated for evidence of obstruction, edema, purulence, polyps and/or mass/lesion.     Nelly-Roc Endoscopic Scoring " System  Endoscopic observation Right Left   Polyps in middle meatus (0 = absent, 1 = restricted to middle meatus, 2 = Beyond middle meatus) 0 0   Discharge (0 = absent, 1 = thin and clear, 2 = thick, purulent) 0 0   Edema (0 = absent, 1 = mild-moderate, 2 = moderate-severe) 0 0   Crusting (0 = absent, 1 = mild-moderate, 2 = moderate-severe) 0 0   Scarring (0= absent, 1 = mild-moderate, 2 = moderate-severe) 0 0   Total 0 0     Findings  RT: MM and SER clear; IT hypertrophy  LT: MM and SER clear; IT hypertrophy; Septal deviation    The patient tolerated the procedure well without complication.     Laboratory Review:  n/a    Imaging Review:  n/a    Pathology Review:  n/a    Assessment/Medical Decision Making:  Nasal congestion secondary to:  IT hypertrophy  Septal deviation    Plan:  Bilateral endoscopy performed today shows no fer or smoldering sinusitis. Symptoms likely due to IT hypertrophy and slight septal deviation.  I discussed continuing Flonase for medical management.  We also discussed turbinate reduction, both-office and submucous resection under general anesthesia.   The benefits and perceived risks associated with each approach were discussed. She will reach out to us via [employer] for follow-up or if she would like to proceed with surgery.     Clark Sykes MD    [facility]  Rhinology  Endoscopic Skull Base Surgery  [facility]  Department of Otolaryngology - Head & Neck Surgery    Scribe Disclosure:  I, Dontae Carrera, am serving as a scribe to document services personally performed by Clark Sykes MD at this visit, based upon the provider's statements to me. All documentation has been reviewed by the aforementioned provider prior to being entered into the official medical record.       Again, thank you for allowing me to participate in the care of your patient.      Sincerely,    Clark Sykes MD

## 2023-06-04 ENCOUNTER — HEALTH MAINTENANCE LETTER (OUTPATIENT)
Age: 28
End: 2023-06-04

## 2023-06-14 ENCOUNTER — MYC MEDICAL ADVICE (OUTPATIENT)
Dept: OTOLARYNGOLOGY | Facility: CLINIC | Age: 28
End: 2023-06-14
Payer: COMMERCIAL

## 2023-06-18 DIAGNOSIS — J34.89 NASAL OBSTRUCTION: Primary | ICD-10-CM

## 2023-06-19 PROBLEM — J34.89 NASAL OBSTRUCTION: Status: ACTIVE | Noted: 2023-06-19

## 2023-06-19 NOTE — TELEPHONE ENCOUNTER
Left patient a voicemail to schedule ENDOSCOPIC REDUCTION, INFERIOR NASAL TURBINATE  ENDOSCOPIC SEPTOPLASTY (Bilateral)  with Dr. Onel Titus on 6/19/2023 at 2:34 PM

## 2023-06-21 ENCOUNTER — TELEPHONE (OUTPATIENT)
Dept: OTOLARYNGOLOGY | Facility: CLINIC | Age: 28
End: 2023-06-21
Payer: COMMERCIAL

## 2023-06-21 NOTE — TELEPHONE ENCOUNTER
Surgery Teaching    1. Someone from our scheduling department will call you within approximately one week to get you scheduled with your provider for surgery. If no one has called you in one week, please notify us.    2. You must have a physical exam (called  history and physical ) within 30 days of surgery. You may complete this with your primary care provider.    In some cases we may have you see our Preoperative Assessment Center. If we have expressed this to you, our  will set up your appointment with them when they call to set up your surgery.    3. For same-day surgery, you must arrange for an adult to take you home from the Center. An adult must stay with you for the first 24 hours after surgery. You cannot drive for 24 hours.     4. Ask your doctor what medicines are safe before surgery. For over the counter medications and supplements it is advised that you do NOT TAKE MOTRIN, IBUPROFEN, ASPIRIN, ALEVE, GARLIC SUPPLEMENTS or FISH OIL x 7 days prior to surgery (to prevent excess bleeding and bruising at time of surgery). Tylenol is ok.    5. A few days prior to surgery a nurse will call you to review your health history and instructions for before and after surgery. They will give you your final arrival time based upon your scheduled arrival time for surgery.    6. Call the surgical team if there's any change in your health prior to surgery within 2 weeks of surgery. Fever, body aches, chills. Flu and covid like symptoms.    7. If you drink alcohol, stop drinking alcohol at least 24 hours before surgery.    8. If you smoke, stop or at least cut down on smoking 24 hours before surgery.    9.Take a bath or shower the night before and the morning of surgery (as told by your surgeon). Use an antiseptic soap. If your doctor does not give you special soap, buy Hibiclens or Olga-Stat at the drug store or ask the pharmacist to suggest a brand. You will wash with this from the neck down, washing your hair  and face as you would normally.   A. When you are done with your shower please be sure to use clean towels to dry with, have clean linens on your bed, and put on clean clothes each time.   B. DO NOT put on lotion, powder, perfume, deodorant or make-up after bathing.    10. You can eat a normal meal the night before surgery. Do not eat any solid foods or consume any dairy products 8 hours before surgery.     11. You may drink clear liquids up until 2 hours before surgery. Example: water, Gatorade, apple juice and liquids you can see through.    12. At the 2 hour point prior to surgery you should not be ingesting anything more. Your post op team will review any diet limitations you might have and when you can start eating and drinking again after surgery.    After surgery:    DO NOT blow your nose until you are seen back in clinic for your first post op appointment.     If you have to sneeze please do so with your mouth open.    You will feel very congested and find it hard to breath through your nose until your first post op appointment.       If you have any questions before or after surgery please call:    BLANKA Palomo  [facility] [place]  Department of Otolaryngology  [phone]

## 2023-06-21 NOTE — TELEPHONE ENCOUNTER
Called patient to schedule surgery with Dr. Sykes    Date of Surgery: 6/29    Location of surgery: [facility]    Pre-Op H&P: PCP on file - verified    Pre/Post Imaging:  Not Applicable    Post-Op Appt Date: 1 week    Surgery Packet Mailed: siomara per patient preference    Additional comments: patient is going to call her PCP and if she cannot get in she will call back for a PAC appointment. Advised patient that briseida will be giving her a call as well to give surgery teaching        Betzy Titus on 6/21/2023 at 10:58 AM

## 2023-06-26 ENCOUNTER — TELEPHONE (OUTPATIENT)
Dept: OTOLARYNGOLOGY | Facility: CLINIC | Age: 28
End: 2023-06-26
Payer: COMMERCIAL

## 2023-06-26 NOTE — TELEPHONE ENCOUNTER
Patient called with [employer] representative wanting to get CPT code for surgery with Dr. Sykes on 6/29/23. Tried transferring call to you, but they hung up before I was able to. I apologize!    Janet Shah on 6/26/2023 at 10:18 AM

## 2023-06-26 NOTE — TELEPHONE ENCOUNTER
Handled in [employer] encounter, sent message to patient including CPT code. Callback number, [phone] was included should patient have additional questions.    Janet Shah on 6/26/2023 at 10:44 AM

## 2023-06-28 ENCOUNTER — ANESTHESIA EVENT (OUTPATIENT)
Dept: SURGERY | Facility: AMBULATORY SURGERY CENTER | Age: 28
End: 2023-06-28
Payer: COMMERCIAL

## 2023-06-29 ENCOUNTER — ANESTHESIA (OUTPATIENT)
Dept: SURGERY | Facility: AMBULATORY SURGERY CENTER | Age: 28
End: 2023-06-29
Payer: COMMERCIAL

## 2023-06-29 ENCOUNTER — HOSPITAL ENCOUNTER (OUTPATIENT)
Facility: AMBULATORY SURGERY CENTER | Age: 28
Discharge: HOME | End: 2023-06-29
Attending: OTOLARYNGOLOGY
Payer: COMMERCIAL

## 2023-06-29 VITALS
TEMPERATURE: 97 F | SYSTOLIC BLOOD PRESSURE: 114 MMHG | WEIGHT: 170 LBS | BODY MASS INDEX: 25.76 KG/M2 | HEART RATE: 75 BPM | HEIGHT: 68 IN | OXYGEN SATURATION: 100 % | DIASTOLIC BLOOD PRESSURE: 66 MMHG | RESPIRATION RATE: 14 BRPM

## 2023-06-29 DIAGNOSIS — J34.89 NASAL OBSTRUCTION: ICD-10-CM

## 2023-06-29 LAB
HCG UR QL: NEGATIVE
INTERNAL QC OK POCT: NORMAL
POCT KIT EXPIRATION DATE: NORMAL
POCT KIT LOT NUMBER: NORMAL

## 2023-06-29 PROCEDURE — 30140 RESECT INFERIOR TURBINATE: CPT | Mod: 50 | Performed by: STUDENT IN AN ORGANIZED HEALTH CARE EDUCATION/TRAINING PROGRAM

## 2023-06-29 PROCEDURE — 81025 URINE PREGNANCY TEST: CPT | Performed by: PATHOLOGY

## 2023-06-29 PROCEDURE — 30140 RESECT INFERIOR TURBINATE: CPT | Mod: 50

## 2023-06-29 RX ORDER — FENTANYL CITRATE 50 UG/ML
25 INJECTION, SOLUTION INTRAMUSCULAR; INTRAVENOUS EVERY 5 MIN PRN
Status: DISCONTINUED | OUTPATIENT
Start: 2023-06-29 | End: 2023-06-30 | Stop reason: HOSPADM

## 2023-06-29 RX ORDER — LIDOCAINE HYDROCHLORIDE AND EPINEPHRINE 10; 10 MG/ML; UG/ML
INJECTION, SOLUTION INFILTRATION; PERINEURAL PRN
Status: DISCONTINUED | OUTPATIENT
Start: 2023-06-29 | End: 2023-06-29 | Stop reason: HOSPADM

## 2023-06-29 RX ORDER — SODIUM CHLORIDE, SODIUM LACTATE, POTASSIUM CHLORIDE, CALCIUM CHLORIDE 600; 310; 30; 20 MG/100ML; MG/100ML; MG/100ML; MG/100ML
INJECTION, SOLUTION INTRAVENOUS CONTINUOUS PRN
Status: DISCONTINUED | OUTPATIENT
Start: 2023-06-29 | End: 2023-06-29

## 2023-06-29 RX ORDER — LIDOCAINE 40 MG/G
CREAM TOPICAL
Status: DISCONTINUED | OUTPATIENT
Start: 2023-06-29 | End: 2023-06-30 | Stop reason: HOSPADM

## 2023-06-29 RX ORDER — SODIUM CHLORIDE, SODIUM LACTATE, POTASSIUM CHLORIDE, CALCIUM CHLORIDE 600; 310; 30; 20 MG/100ML; MG/100ML; MG/100ML; MG/100ML
INJECTION, SOLUTION INTRAVENOUS CONTINUOUS
Status: DISCONTINUED | OUTPATIENT
Start: 2023-06-29 | End: 2023-06-30 | Stop reason: HOSPADM

## 2023-06-29 RX ORDER — ONDANSETRON 4 MG/1
4 TABLET, ORALLY DISINTEGRATING ORAL EVERY 30 MIN PRN
Status: DISCONTINUED | OUTPATIENT
Start: 2023-06-29 | End: 2023-06-30 | Stop reason: HOSPADM

## 2023-06-29 RX ORDER — ACETAMINOPHEN 325 MG/1
975 TABLET ORAL ONCE
Status: COMPLETED | OUTPATIENT
Start: 2023-06-29 | End: 2023-06-29

## 2023-06-29 RX ORDER — LIDOCAINE HYDROCHLORIDE 20 MG/ML
INJECTION, SOLUTION INFILTRATION; PERINEURAL PRN
Status: DISCONTINUED | OUTPATIENT
Start: 2023-06-29 | End: 2023-06-29

## 2023-06-29 RX ORDER — OXYCODONE HYDROCHLORIDE 5 MG/1
5 TABLET ORAL EVERY 6 HOURS PRN
Qty: 12 TABLET | Refills: 0 | Status: SHIPPED | OUTPATIENT
Start: 2023-06-29 | End: 2023-07-02

## 2023-06-29 RX ORDER — CLOBETASOL PROPIONATE 0.5 MG/G
OINTMENT TOPICAL
COMMUNITY
Start: 2023-05-05

## 2023-06-29 RX ORDER — ONDANSETRON 2 MG/ML
INJECTION INTRAMUSCULAR; INTRAVENOUS PRN
Status: DISCONTINUED | OUTPATIENT
Start: 2023-06-29 | End: 2023-06-29

## 2023-06-29 RX ORDER — OXYCODONE HYDROCHLORIDE 5 MG/1
5 TABLET ORAL
Status: DISCONTINUED | OUTPATIENT
Start: 2023-06-29 | End: 2023-06-30 | Stop reason: HOSPADM

## 2023-06-29 RX ORDER — HYDROMORPHONE HYDROCHLORIDE 1 MG/ML
0.4 INJECTION, SOLUTION INTRAMUSCULAR; INTRAVENOUS; SUBCUTANEOUS EVERY 5 MIN PRN
Status: DISCONTINUED | OUTPATIENT
Start: 2023-06-29 | End: 2023-06-30 | Stop reason: HOSPADM

## 2023-06-29 RX ORDER — OXYMETAZOLINE HYDROCHLORIDE 0.05 G/100ML
SPRAY NASAL PRN
Status: DISCONTINUED | OUTPATIENT
Start: 2023-06-29 | End: 2023-06-29 | Stop reason: HOSPADM

## 2023-06-29 RX ORDER — SCOLOPAMINE TRANSDERMAL SYSTEM 1 MG/1
1 PATCH, EXTENDED RELEASE TRANSDERMAL ONCE
Status: DISCONTINUED | OUTPATIENT
Start: 2023-06-29 | End: 2023-06-30 | Stop reason: HOSPADM

## 2023-06-29 RX ORDER — DEXAMETHASONE SODIUM PHOSPHATE 4 MG/ML
INJECTION, SOLUTION INTRA-ARTICULAR; INTRALESIONAL; INTRAMUSCULAR; INTRAVENOUS; SOFT TISSUE PRN
Status: DISCONTINUED | OUTPATIENT
Start: 2023-06-29 | End: 2023-06-29

## 2023-06-29 RX ORDER — CLINDAMYCIN PHOSPHATE 900 MG/50ML
900 INJECTION, SOLUTION INTRAVENOUS SEE ADMIN INSTRUCTIONS
Status: DISCONTINUED | OUTPATIENT
Start: 2023-06-29 | End: 2023-06-30 | Stop reason: HOSPADM

## 2023-06-29 RX ORDER — CLINDAMYCIN PHOSPHATE 900 MG/50ML
900 INJECTION, SOLUTION INTRAVENOUS
Status: COMPLETED | OUTPATIENT
Start: 2023-06-29 | End: 2023-06-29

## 2023-06-29 RX ORDER — PROPOFOL 10 MG/ML
INJECTION, EMULSION INTRAVENOUS PRN
Status: DISCONTINUED | OUTPATIENT
Start: 2023-06-29 | End: 2023-06-29

## 2023-06-29 RX ORDER — KETAMINE HYDROCHLORIDE 10 MG/ML
INJECTION INTRAMUSCULAR; INTRAVENOUS PRN
Status: DISCONTINUED | OUTPATIENT
Start: 2023-06-29 | End: 2023-06-29

## 2023-06-29 RX ORDER — EPINEPHRINE 1 MG/ML
INJECTION, SOLUTION INTRAMUSCULAR; SUBCUTANEOUS PRN
Status: DISCONTINUED | OUTPATIENT
Start: 2023-06-29 | End: 2023-06-29 | Stop reason: HOSPADM

## 2023-06-29 RX ORDER — DEXAMETHASONE SODIUM PHOSPHATE 10 MG/ML
10 INJECTION, SOLUTION INTRAMUSCULAR; INTRAVENOUS ONCE
Status: DISCONTINUED | OUTPATIENT
Start: 2023-06-29 | End: 2023-06-30 | Stop reason: HOSPADM

## 2023-06-29 RX ORDER — PROPOFOL 10 MG/ML
INJECTION, EMULSION INTRAVENOUS CONTINUOUS PRN
Status: DISCONTINUED | OUTPATIENT
Start: 2023-06-29 | End: 2023-06-29

## 2023-06-29 RX ORDER — ECHINACEA PURPUREA EXTRACT 125 MG
TABLET ORAL
Qty: 480 ML | Refills: 0 | Status: SHIPPED | OUTPATIENT
Start: 2023-06-29

## 2023-06-29 RX ORDER — FENTANYL CITRATE 50 UG/ML
50 INJECTION, SOLUTION INTRAMUSCULAR; INTRAVENOUS EVERY 5 MIN PRN
Status: DISCONTINUED | OUTPATIENT
Start: 2023-06-29 | End: 2023-06-30 | Stop reason: HOSPADM

## 2023-06-29 RX ORDER — FENTANYL CITRATE 50 UG/ML
INJECTION, SOLUTION INTRAMUSCULAR; INTRAVENOUS PRN
Status: DISCONTINUED | OUTPATIENT
Start: 2023-06-29 | End: 2023-06-29

## 2023-06-29 RX ORDER — HYDROMORPHONE HYDROCHLORIDE 1 MG/ML
0.2 INJECTION, SOLUTION INTRAMUSCULAR; INTRAVENOUS; SUBCUTANEOUS EVERY 5 MIN PRN
Status: DISCONTINUED | OUTPATIENT
Start: 2023-06-29 | End: 2023-06-30 | Stop reason: HOSPADM

## 2023-06-29 RX ORDER — ONDANSETRON 2 MG/ML
4 INJECTION INTRAMUSCULAR; INTRAVENOUS EVERY 30 MIN PRN
Status: DISCONTINUED | OUTPATIENT
Start: 2023-06-29 | End: 2023-06-30 | Stop reason: HOSPADM

## 2023-06-29 RX ORDER — OXYCODONE HYDROCHLORIDE 5 MG/1
10 TABLET ORAL
Status: DISCONTINUED | OUTPATIENT
Start: 2023-06-29 | End: 2023-06-30 | Stop reason: HOSPADM

## 2023-06-29 RX ORDER — ONDANSETRON 8 MG/1
8 TABLET, ORALLY DISINTEGRATING ORAL EVERY 8 HOURS PRN
Qty: 12 TABLET | Refills: 0 | Status: SHIPPED | OUTPATIENT
Start: 2023-06-29

## 2023-06-29 RX ADMIN — LIDOCAINE HYDROCHLORIDE 100 MG: 20 INJECTION, SOLUTION INFILTRATION; PERINEURAL at 11:57

## 2023-06-29 RX ADMIN — KETAMINE HYDROCHLORIDE 25 MG: 10 INJECTION INTRAMUSCULAR; INTRAVENOUS at 11:54

## 2023-06-29 RX ADMIN — ACETAMINOPHEN 975 MG: 325 TABLET ORAL at 09:42

## 2023-06-29 RX ADMIN — Medication 50 MG: at 11:02

## 2023-06-29 RX ADMIN — ONDANSETRON 4 MG: 2 INJECTION INTRAMUSCULAR; INTRAVENOUS at 12:10

## 2023-06-29 RX ADMIN — PROPOFOL 160 MG: 10 INJECTION, EMULSION INTRAVENOUS at 11:01

## 2023-06-29 RX ADMIN — LIDOCAINE HYDROCHLORIDE 100 MG: 20 INJECTION, SOLUTION INFILTRATION; PERINEURAL at 11:20

## 2023-06-29 RX ADMIN — FENTANYL CITRATE 50 MCG: 50 INJECTION, SOLUTION INTRAMUSCULAR; INTRAVENOUS at 11:01

## 2023-06-29 RX ADMIN — CLINDAMYCIN PHOSPHATE 900 MG: 900 INJECTION, SOLUTION INTRAVENOUS at 11:00

## 2023-06-29 RX ADMIN — PROPOFOL 50 MG: 10 INJECTION, EMULSION INTRAVENOUS at 11:53

## 2023-06-29 RX ADMIN — FENTANYL CITRATE 50 MCG: 50 INJECTION, SOLUTION INTRAMUSCULAR; INTRAVENOUS at 11:42

## 2023-06-29 RX ADMIN — SODIUM CHLORIDE, SODIUM LACTATE, POTASSIUM CHLORIDE, CALCIUM CHLORIDE: 600; 310; 30; 20 INJECTION, SOLUTION INTRAVENOUS at 09:42

## 2023-06-29 RX ADMIN — PROPOFOL 40 MG: 10 INJECTION, EMULSION INTRAVENOUS at 11:42

## 2023-06-29 RX ADMIN — SODIUM CHLORIDE, SODIUM LACTATE, POTASSIUM CHLORIDE, CALCIUM CHLORIDE: 600; 310; 30; 20 INJECTION, SOLUTION INTRAVENOUS at 10:02

## 2023-06-29 RX ADMIN — PROPOFOL 100 MCG/KG/MIN: 10 INJECTION, EMULSION INTRAVENOUS at 11:03

## 2023-06-29 RX ADMIN — DEXAMETHASONE SODIUM PHOSPHATE 8 MG: 4 INJECTION, SOLUTION INTRA-ARTICULAR; INTRALESIONAL; INTRAMUSCULAR; INTRAVENOUS; SOFT TISSUE at 11:15

## 2023-06-29 RX ADMIN — SCOLOPAMINE TRANSDERMAL SYSTEM 1 PATCH: 1 PATCH, EXTENDED RELEASE TRANSDERMAL at 09:52

## 2023-06-29 RX ADMIN — PROPOFOL 30 MG: 10 INJECTION, EMULSION INTRAVENOUS at 11:49

## 2023-06-29 NOTE — ANESTHESIA CARE TRANSFER NOTE
Patient: Rubi Gonsales    Procedure: Procedure(s):  ENDOSCOPIC REDUCTION, INFERIOR NASAL TURBINATE  ENDOSCOPIC       Diagnosis: Nasal obstruction [J34.89]  Diagnosis Additional Information: No value filed.    Anesthesia Type:   General     Note:          Level of Supplemental Oxygen (L/min / FiO2): 8  Independent Airway: airway patency satisfactory and stable  Dentition: dentition unchanged  Vital Signs Stable: post-procedure vital signs reviewed and stable  Report to RN Given: handoff report given            Vitals:  Vitals Value Taken Time   /65 06/29/23 1235   Temp 36.3  C (97.4  F) 06/29/23 1235   Pulse 73 06/29/23 1242   Resp 18 06/29/23 1242   SpO2 100 % 06/29/23 1242   Vitals shown include unvalidated device data.    Electronically Signed By: PRESTON Madrid CRNA  June 29, 2023  12:43 PM

## 2023-06-29 NOTE — ANESTHESIA PREPROCEDURE EVALUATION
Anesthesia Pre-Procedure Evaluation    Patient: Rubi Gonsales   MRN: [id number] : 1995        Procedure : Procedure(s):  ENDOSCOPIC REDUCTION, INFERIOR NASAL TURBINATE  ENDOSCOPIC SEPTOPLASTY          Past Medical History:   Diagnosis Date     Anxiety      Depression       Past Surgical History:   Procedure Laterality Date     NO HISTORY OF SURGERY        Allergies   Allergen Reactions     Cephalosporins Rash     Penicillins Rash      Social History     Tobacco Use     Smoking status: Never     Smokeless tobacco: Never   Substance Use Topics     Alcohol use: Yes      Wt Readings from Last 1 Encounters:   23 77.1 kg (170 lb)        Anesthesia Evaluation   Pt has not had prior anesthetic         ROS/MED HX  ENT/Pulmonary: Comment: Nasal obstruction      Neurologic:  - neg neurologic ROS     Cardiovascular:  - neg cardiovascular ROS     METS/Exercise Tolerance:     Hematologic:  - neg hematologic  ROS     Musculoskeletal:  - neg musculoskeletal ROS     GI/Hepatic:  - neg GI/hepatic ROS     Renal/Genitourinary:  - neg Renal ROS     Endo:  - neg endo ROS     Psychiatric/Substance Use:     (+) psychiatric history anxiety and depression     Infectious Disease:  - neg infectious disease ROS     Malignancy:  - neg malignancy ROS     Other:               OUTSIDE LABS:  CBC: No results found for: WBC, HGB, HCT, PLT  BMP:   Lab Results   Component Value Date    POTASSIUM 4.4 2021    CR 0.880 2021    GLC 87 2021     COAGS: No results found for: PTT, INR, FIBR  POC: No results found for: BGM, HCG, HCGS  HEPATIC: No results found for: ALBUMIN, PROTTOTAL, ALT, AST, GGT, ALKPHOS, BILITOTAL, BILIDIRECT, LAURENT  OTHER:   Lab Results   Component Value Date    A1C 5.10 2021       Anesthesia Plan    ASA Status:  1   NPO Status:  NPO Appropriate    Anesthesia Type: General.     - Airway: ETT   Induction: Intravenous.   Maintenance: Balanced.        Consents    Anesthesia Plan(s) and associated risks,  benefits, and realistic alternatives discussed. Questions answered and patient/representative(s) expressed understanding.    - Discussed:     - Discussed with:  Patient         Postoperative Care    Pain management: Multi-modal analgesia.   PONV prophylaxis: Background Propofol Infusion, Ondansetron (or other 5HT-3), Dexamethasone or Solumedrol     Comments:           H&P reviewed: Unable to attach H&P to encounter due to EHR limitations. H&P Update: appropriate H&P reviewed, patient examined. No interval changes since H&P (within 30 days).         Yelena Fragoso MD

## 2023-06-29 NOTE — ANESTHESIA POSTPROCEDURE EVALUATION
Patient: Rubi Gonsales    Procedure: Procedure(s):  ENDOSCOPIC REDUCTION, INFERIOR NASAL TURBINATE  ENDOSCOPIC       Anesthesia Type:  General    Note:  Disposition: Outpatient   Postop Pain Control: Uneventful            Sign Out: Well controlled pain   PONV: No   Neuro/Psych: Uneventful            Sign Out: Acceptable/Baseline neuro status   Airway/Respiratory: Uneventful            Sign Out: Acceptable/Baseline resp. status   CV/Hemodynamics: Uneventful            Sign Out: Acceptable CV status; No obvious hypovolemia; No obvious fluid overload   Other NRE: NONE   DID A NON-ROUTINE EVENT OCCUR? No           Last vitals:  Vitals Value Taken Time   /77 06/29/23 1300   Temp 36.6  C (97.8  F) 06/29/23 1300   Pulse 65 06/29/23 1300   Resp 6 06/29/23 1300   SpO2 100 % 06/29/23 1300   Vitals shown include unvalidated device data.    Electronically Signed By: Yelena Fragoso MD  June 29, 2023  1:35 PM

## 2023-06-29 NOTE — DISCHARGE INSTRUCTIONS
"Dr. Sykes's Surgical Discharge Instructions  1. Start rinsing the nasal cavities with ocean saline spray or saline irrigations as directed on prescription. This will assist with healing after surgery.   2. Take medications as prescribed.    3. You have been prescribed three medications: a narcotic pain medication (oxycodone) to take as needed for pain not controlled with tylenol; a medication for nausea (ondansetron, aka zofran) to use for nausea and/or vomiting; and, a nasal saline spray. An antibiotic will be prescribed if there was evidence of infection during surgery and cultures were obtained at the time of surgery.    4. Do not drive or operate machinery while taking narcotic pain medication.   5. For nasal bleeding that is bothersome or excessive, spray afrin (oxymetazoline) nasal spray (four sprays into each nostril) and pinch the tip of the nose closed for 10 minutes. If you find you have done this four times in one hour and are still having bothersome bleeding, please call your doctor. You will be given a bottle of afrin at the time of discharge.  This was used at the end of your surgery in your own nose, so you will find that the bottle is already opened, and may have some small streaks of blood on the tip of the bottle. Please do not be alarmed, as this was used on you, and you alone!  6. There are no activity restrictions after surgery, but please \"listen to your body\". If you feel like you are doing too much, please reduce your activity level. The one minor restriction I have is that you avoid excessive nose-blowing, as this can lead to nasal bleeding.   7. Please call your doctor for any headache not controlled with pain medication, severe nausea or vomiting, fevers >100.4, changes in mental status, or any other concerning symptoms you may identify.      Clark Sykes MD    [facility]  Center for Skull Base and Pituitary Surgery  [facility]  Department " of Otolaryngology - Head & Neck Surgery    [facility]  Home Care Following Anesthesia  For 24 hours after surgery:  Get plenty of rest.  A responsible adult must stay with you for at least 24 hours after you leave the surgery center.  Do not drive or use heavy equipment.  If you have weakness or tingling, don't drive or use heavy equipment until this feeling goes away.   Do not drink alcohol.   Avoid strenuous or risky activities.  Ask for help when climbing stairs.  You may feel lightheaded.  IF so, sit for a few minutes before standing.  Have someone help you get up.   If you have nausea (feel sick to your stomach): Drink only clear liquids such as apple juice, ginger ale, broth or 7-Up.  Rest may also help.  Be sure to drink enough fluids.  Move to a regular diet as you feel able.   You may have a slight fever.  Call the doctor if your fever is over 100 F (37.7 C) (taken under the tongue) or lasts longer than 24 hours.  You may have a dry mouth, a sore throat, muscle aches or trouble sleeping. These should go away after 24 hours.  Do not make important or legal decisions.   It is recommended to avoid smoking.   If you use hormonal birth control (such as the pill, patch, ring or implants):  You will need a second form of birth control for 7 days (condoms, a diaphragm or contraceptive foam).  While in the surgery center, you received a medicine called Sugammadex.  Hormonal birth control (such as the pill, patch, ring or implants) will not work as well for a week after taking this medicine.         Tips for taking pain medications  To get the best pain relief possible, remember these points:  Take pain medications as directed, before pain becomes severe.  Pain medication can upset your stomach: taking it with food may help.  Constipation is a common side effect of pain medication. Drink plenty of  fluids.  Eat foods high in fiber. Take a stool softener if recommended by your  doctor or pharmacist.  Do not drink alcohol, drive or operate machinery while taking pain medications.  Ask about other ways to control pain, such as with heat, ice or relaxation.    Tylenol/Acetaminophen Consumption    If you feel your pain relief is insufficient, you may take Tylenol/Acetaminophen in addition to your narcotic pain medication.   Be careful not to exceed 4,000 mg of Tylenol/Acetaminophen in a 24 hour period from all sources.  If you are taking extra strength Tylenol/acetaminophen (500 mg), the maximum dose is 8 tablets in 24 hours.  If you are taking regular strength acetaminophen (325 mg), the maximum dose is 12 tablets in 24 hours.    Call a doctor for any of the following:  Signs of infection (fever, growing tenderness at the surgery site, a large amount of drainage or bleeding, severe pain, foul-smelling drainage, redness, swelling).  It has been over 8 to 10 hours since surgery and you are still not able to urinate (pass water).  Headache for over 24 hours.  Numbness, tingling or weakness the day after surgery (if you had spinal anesthesia).  Signs of Covid-19 infection (temperature over 100 degrees, shortness of breath, cough, loss of taste/smell, generalized body aches, persistent headache, chills, sore throat, nausea/vomiting/diarrhea)  Your doctor is:       Dr. Clark Sykes, ENT Otolaryngology: [phone]               Or dial [phone] and ask for the resident on call for:  ENT Otolaryngology  For emergency care, call the:  [facility]:  [phone] (TTY for hearing impaired: [phone])

## 2023-06-29 NOTE — OP NOTE
Otolaryngology Operative Report     Date of Operation: 2023  Patient Name: Rubi Gonsales  Patient : 1995   Patient MRN: [id number]    Staff Surgeon: Clark Sykes MD  Resident Surgeon: Marcia Corado MD  Preoperative Diagnosis:   1. Inferior turbinate hypertrophy  2. Chronic nasal congestion  Postoperative Diagnosis: Same  Procedure:   1. Bilateral endoscopic inferior turbinate reduction  Anesthesia: General  Findings: Bilateral inferior turbinate hypertrophy  EBL: 2 cc  Complications: None  Specimens: None    Clinical Indications: Rubi Gonsales is a 27 year old female with history of chronic nasal congestion and was recommended to undergo aforementioned procedure. All risks and benefits were discussed with the consenting party and they elected to proceed with the procedure.  Description of Procedure: The patient was brought to the operating room and placed in supine position on the operating table. General anesthesia was induced and all appropriate monitoring lines were placed.  The table was then turned 180 degrees, the patient was prepped and draped in usual sterile fashion and a surgical time-out was performed with all operative staff in agreement.   We began with topical decongestion using epinephrine-soaked pledgits, followed by injection of 6cc of 1% lidocaine with 1:100,000 epinephrine into bilateral inferior turbinates. We then began on the right side, under direct endoscopic visualization using a 0-degree sinus endoscope, a vertical incision was made with a needle-tip cautery along the anterior head of the inferior turbinate down to the underlying turbinate bone. Using a marta a submucous flap was elevated. Hemostasis was achieved with intermittent application of an epinephrine soaked pledget. Once the underlying bone was identified, the lateral mucosa was then dissected off carefully to avoid any perforation. The bone was fractured off using a combination of marta and suction  freer, and removed using a Blakesley. The redundant turbinate mucosa was then addressed using a turbinate shaver through the incision, taking care to not cause any perforations. Once the turbinate was adequately decompressed, the nasal cavity and nasopharynx were suctioned out. The exact same procedure was performed on the opposite side. An orogastric tube was passed.  This concluded our procedure. The patient was then turned over to our anesthesia colleagues and was extubated and taken to the PACU in satisfactory and stable condition.     Dr. Sykes was present for the procedure    I, Clark Sykes MD, was present during the key portions of the procedure, and I was immediately available for the entire procedure between opening and closing.

## 2023-06-29 NOTE — BRIEF OP NOTE
[facility] Brief Operative Note    Pre-operative diagnosis: Nasal obstruction [J34.89]   Post-operative diagnosis same   Procedure: Procedure(s):  ENDOSCOPIC REDUCTION, INFERIOR NASAL TURBINATE  ENDOSCOPIC   Surgeon(s): Surgeon(s) and Role:     * Clark Sykes MD - Primary     * Marcia Corado MD - Resident - Assisting   Estimated blood loss: * No values recorded between 6/29/2023 11:24 AM and 6/29/2023 12:25 PM *    Specimens: none   Findings: Severe IT hypertroph  No immediate complications      Clark Sykes MD    [facility]  Center for Skull Base and Pituitary Surgery  [facility]  Department of Otolaryngology - Head & Neck Surgery

## 2023-07-07 ENCOUNTER — OFFICE VISIT (OUTPATIENT)
Dept: OTOLARYNGOLOGY | Facility: CLINIC | Age: 28
End: 2023-07-07
Payer: COMMERCIAL

## 2023-07-07 VITALS
DIASTOLIC BLOOD PRESSURE: 73 MMHG | SYSTOLIC BLOOD PRESSURE: 108 MMHG | BODY MASS INDEX: 25.01 KG/M2 | HEIGHT: 68 IN | OXYGEN SATURATION: 99 % | WEIGHT: 165 LBS | HEART RATE: 60 BPM

## 2023-07-07 DIAGNOSIS — J34.89 NASAL OBSTRUCTION: Primary | ICD-10-CM

## 2023-07-07 PROCEDURE — 31237 NSL/SINS NDSC SURG BX POLYPC: CPT | Mod: 50 | Performed by: OTOLARYNGOLOGY

## 2023-07-07 ASSESSMENT — PAIN SCALES - GENERAL: PAINLEVEL: NO PAIN (0)

## 2023-07-07 NOTE — PATIENT INSTRUCTIONS
You were seen in the ENT Clinic today by Dr. Sykes. If you have any questions or concerns after your appointment, please contact us (see below)       2.   The following recommendations have been made based upon your appointment today:   -Continue saline sprays.      3.   Please return to the ENT clinic in 4-6 weeks. Ok to cancel if you are feeling okay.           How to Contact Us:  Send a [employer] message to your provider. Our team will respond to you via [employer]. Occasionally, we will need to call you to get further information.  For urgent matters (Monday-Friday), call the ENT Clinic: [phone] and speak with a call center team member - they will route your call appropriately.   If you'd like to speak directly with a nurse, please find our contact information below. We do our best to check voicemail frequently throughout the day, and will work to call you back within 1-2 days. For urgent matters, please use the general clinic phone numbers listed above.        Randa DIAZ RN  ENT RN Care Coordinator  Direct: [phone]  Pat GREEN LPN  Direct: [phone]         [facility]  Department of Otolaryngology

## 2023-07-07 NOTE — LETTER
"7/7/2023       RE: Rubi Gonsales  [address]  [place] [address]     Dear Colleague,    Thank you for referring your patient, Rubi Gonsales, to the [facility] [place] at [facility]. Please see a copy of my visit note below.                       [facility]                       Return Visit      Encounter date: July 7, 2023    Chief Complaint: Postoperative visit    ID: Nasal obstruction status post surgery as below    Interval History: Rubi Gonsales returns for first postoperative visit after endoscopic inferior turbinate reduction.  She is doing well.  No major bleeding.  Has some congestion related to surgery.      [place] [facility]    Staff Surgeon: Clark Sykes MD  Resident Surgeon: Marcia Corado MD  Preoperative Diagnosis:   1. Inferior turbinate hypertrophy  2. Chronic nasal congestion  Postoperative Diagnosis: Same  Procedure:   1. Bilateral endoscopic inferior turbinate reduction    Review of systems: A 14-point review of systems has been conducted and is negative for any notable symptoms, except as dictated in the history of present illness.     Physical Exam:  Vital signs: /73 (BP Location: Left arm, Patient Position: Sitting, Cuff Size: Adult Large)   Pulse 60   Ht 1.727 m (5' 8\")   Wt 74.8 kg (165 lb)   SpO2 99%   BMI 25.09 kg/m     General Appearance: No acute distress, appropriate demeanor, conversant  Eyes: moist conjunctivae; EOMI; pupils symmetric; visual acuity grossly intact; no proptosis  Head: normocephalic; overall symmetric appearance without deformity  Face: overall symmetric without deformity; HB I/VI  Ears: Normal appearance of external ear;  Nose: No external deformity; see nasal endoscopy    Procedure Note  Procedure performed: Rigid nasal endoscopy with bilateral debridement  Indication: To evaluate for sinonasal pathology not visualized on routine " anterior rhinoscopy  Anesthesia: 4% topical lidocaine with 0.05 % oxymetazoline  Description of procedure: A 30 degree, 3 mm rigid endoscope was inserted into bilateral nasal cavities and the nasal valves, nasal cavity, middle meatus, sphenoethmoid recess, nasopharynx were evaluated for evidence of obstruction, edema, purulence, polyps and/or mass/lesion.     A combination of straight suction and noncutting forceps to clear bilateral nasal cavities of crusting and debris    Nelly-Roc Endoscopic Scoring SystemFindings  RT: Well-healed nasal cavity without scarring  LT: Well-healed nasal cavity and turbinate without scarring    The patient tolerated the procedure well without complication.     Laboratory Review:  n/a    Imaging Review:  n/a    Pathology Review:  n/a    Assessment/Medical Decision Making:  Nasal obstruction status post bilateral inferior turbinate submucous resection      Plan:  Bilateral Endo debridement today  Healing well  Continue saline sprays  Follow-up in 4 to 6 weeks, may cancel if she is doing well    Clark Sykes MD    [facility]  Center for Skull Base and Pituitary Surgery  [facility]  Department of Otolaryngology - Head & Neck Surgery    Additional portions of the patient's have been reviewed below.   ~~~~~~~~~~~~~~~~~~~~~~~~~~~~~~~~~~~~~~~~~~~~~~~~~~~~~~~~~~~~~~~~~~~~~~~~~~~~~~~~~~~~~~~~~~~~~~~~~~~~~~~~~~~~~~~~~~~~~~~~~~~~~~~~~~~~~~~    Past Medical History:   Diagnosis Date     Anxiety      Depression         Past Surgical History:   Procedure Laterality Date     ENDOSCOPIC REDUCTION, INFERIOR NASAL TURBINATE  Bilateral 6/29/2023    Procedure: ENDOSCOPIC REDUCTION, INFERIOR NASAL TURBINATE  ENDOSCOPIC;  Surgeon: Clark Sykes MD;  Location: [facility] OR     NO HISTORY OF SURGERY          Family History   Problem Relation Age of Onset     No Known Problems Mother      No Known Problems Father      No Known Problems Sister       Hypertension Brother      Hypertension Maternal Grandmother      No Known Problems Maternal Grandfather      No Known Problems Paternal Grandmother      No Known Problems Other         Social History     Socioeconomic History     Marital status: Single   Tobacco Use     Smoking status: Never     Smokeless tobacco: Never   Substance and Sexual Activity     Alcohol use: Yes     Drug use: Never     Sexual activity: Yes     Partners: Male     Birth control/protection: Inserts/Ring                Again, thank you for allowing me to participate in the care of your patient.      Sincerely,    Clark Sykes MD

## 2023-07-07 NOTE — PROGRESS NOTES
"                   [facility]                       Return Visit      Encounter date: July 7, 2023    Chief Complaint: Postoperative visit    ID: Nasal obstruction status post surgery as below    Interval History: Rubi Gonsales returns for first postoperative visit after endoscopic inferior turbinate reduction.  She is doing well.  No major bleeding.  Has some congestion related to surgery.      [place] [facility]    Staff Surgeon: Clark Sykes MD  Resident Surgeon: Marcia Corado MD  Preoperative Diagnosis:   1. Inferior turbinate hypertrophy  2. Chronic nasal congestion  Postoperative Diagnosis: Same  Procedure:   1. Bilateral endoscopic inferior turbinate reduction    Review of systems: A 14-point review of systems has been conducted and is negative for any notable symptoms, except as dictated in the history of present illness.     Physical Exam:  Vital signs: /73 (BP Location: Left arm, Patient Position: Sitting, Cuff Size: Adult Large)   Pulse 60   Ht 1.727 m (5' 8\")   Wt 74.8 kg (165 lb)   SpO2 99%   BMI 25.09 kg/m     General Appearance: No acute distress, appropriate demeanor, conversant  Eyes: moist conjunctivae; EOMI; pupils symmetric; visual acuity grossly intact; no proptosis  Head: normocephalic; overall symmetric appearance without deformity  Face: overall symmetric without deformity; HB I/VI  Ears: Normal appearance of external ear;  Nose: No external deformity; see nasal endoscopy    Procedure Note  Procedure performed: Rigid nasal endoscopy with bilateral debridement  Indication: To evaluate for sinonasal pathology not visualized on routine anterior rhinoscopy  Anesthesia: 4% topical lidocaine with 0.05 % oxymetazoline  Description of procedure: A 30 degree, 3 mm rigid endoscope was inserted into bilateral nasal cavities and the nasal valves, nasal cavity, middle meatus, sphenoethmoid recess, nasopharynx were evaluated for evidence of obstruction, edema, " purulence, polyps and/or mass/lesion.     A combination of straight suction and noncutting forceps to clear bilateral nasal cavities of crusting and debris    Nelly-Roc Endoscopic Scoring SystemFindings  RT: Well-healed nasal cavity without scarring  LT: Well-healed nasal cavity and turbinate without scarring    The patient tolerated the procedure well without complication.     Laboratory Review:  n/a    Imaging Review:  n/a    Pathology Review:  n/a    Assessment/Medical Decision Making:  Nasal obstruction status post bilateral inferior turbinate submucous resection      Plan:  Bilateral Endo debridement today  Healing well  Continue saline sprays  Follow-up in 4 to 6 weeks, may cancel if she is doing well    Clark Sykes MD    [facility]  Center for Skull Base and Pituitary Surgery  [facility]  Department of Otolaryngology - Head & Neck Surgery    Additional portions of the patient's have been reviewed below.   ~~~~~~~~~~~~~~~~~~~~~~~~~~~~~~~~~~~~~~~~~~~~~~~~~~~~~~~~~~~~~~~~~~~~~~~~~~~~~~~~~~~~~~~~~~~~~~~~~~~~~~~~~~~~~~~~~~~~~~~~~~~~~~~~~~~~~~~    Past Medical History:   Diagnosis Date     Anxiety      Depression         Past Surgical History:   Procedure Laterality Date     ENDOSCOPIC REDUCTION, INFERIOR NASAL TURBINATE  Bilateral 6/29/2023    Procedure: ENDOSCOPIC REDUCTION, INFERIOR NASAL TURBINATE  ENDOSCOPIC;  Surgeon: Clark Sykes MD;  [facility]     NO HISTORY OF SURGERY          Family History   Problem Relation Age of Onset     No Known Problems Mother      No Known Problems Father      No Known Problems Sister      Hypertension Brother      Hypertension Maternal Grandmother      No Known Problems Maternal Grandfather      No Known Problems Paternal Grandmother      No Known Problems Other         Social History     Socioeconomic History     Marital status: Single   Tobacco Use     Smoking status: Never     Smokeless tobacco: Never    Substance and Sexual Activity     Alcohol use: Yes     Drug use: Never     Sexual activity: Yes     Partners: Male     Birth control/protection: Inserts/Ring

## 2023-08-30 NOTE — PROGRESS NOTES
Virtual Visit Details    Type of service:  Video Visit     Originating Location (pt. Location):   Distant Location (provider location):  Off-site  Platform used for Video Visit: Rodriguez      Name: Rubi Gonsales is a 27 year old woman, seen at the request of Willa Merrill CNP for evaluation of     Chief Complaint   Patient presents with    Endocrine Problem     Low estrogen ?       HPI:  Recent issues:  Here for evaluation of low estrogen level  Reviewed medical history from patient and Epic chart record        Menarche age 12-13  She recalls initial menstrual cycling regular  Age 13- started OCP med for acne management, continued OCP ages [age] yoa  Also had acne Accutane treatment x2 courses    ~10/2022. Started spironolactone 100 mg daily for acne  Medical evaluation with Willa Merrill CNP/[facility]  7/2022. Started use of copper IUD  Recalls having 2 regular menstrual cycles  10/2022. Began having irregular menstrual cycling, spotting  1/2023-2/2023. Regular menstrual cycles  3/2023. Lab testing included low estradiol levels  4/2023-8/2023. No menstrual cycling  LMP late 8/2023    Denies hot flashes or night sweats, headaches, hirsutism, breast symptoms  Some arthralgias... may be due to rock climbing    Previous [facility] labs include:  3/21/23 Estradiol, Free 0.28 pg/mL (Follicular 0.43-5.03, Luteal 0.4-5.55, postmenopausal < 0.38),   Estradiol 14 pg/mL(Follicular ), Luteal , postmenopausal <10)    Previous [facility] labs:  none available    Additional history:  Pituitary dis None  Hirsutism: None  Pelvic U/S None known    Other chronic illnesses include:   Anxiety/Depression  Takes WellbutrinXL med, followed by PCP   Acne::     Takes spironolactone med, followed by PCP      Lives in [place], [place], PhD student at [employer]  [facility]illa Merrill [facility].    PMH/PSH:  Past Medical History:   Diagnosis  Date    Acne     Anxiety     Depression     Irregular menses      Past Surgical History:   Procedure Laterality Date    ENDOSCOPIC REDUCTION, INFERIOR NASAL TURBINATE  Bilateral 6/29/2023    Procedure: ENDOSCOPIC REDUCTION, INFERIOR NASAL TURBINATE  ENDOSCOPIC;  Surgeon: Clark Sykes MD;  Location: [facility] OR    NO HISTORY OF SURGERY         Family Hx:  Family History   Problem Relation Age of Onset    No Known Problems Mother     No Known Problems Father     No Known Problems Sister     Hypertension Brother     Hypertension Maternal Grandmother     No Known Problems Maternal Grandfather     No Known Problems Paternal Grandmother     No Known Problems Other          Social Hx:  Social History     Socioeconomic History    Marital status: Single     Spouse name: Not on file    Number of children: Not on file    Years of education: Not on file    Highest education level: Not on file   Occupational History    Not on file   Tobacco Use    Smoking status: Never    Smokeless tobacco: Never   Substance and Sexual Activity    Alcohol use: Yes    Drug use: Never    Sexual activity: Yes     Partners: Male     Birth control/protection: Inserts/Ring   Other Topics Concern    Parent/sibling w/ CABG, MI or angioplasty before 65F 55M? Not Asked   Social History Narrative    Not on file     Social Determinants of Health     Financial Resource Strain: Not on file   Food Insecurity: Not on file   Transportation Needs: Not on file   Physical Activity: Not on file   Stress: Not on file   Social Connections: Not on file   Intimate Partner Violence: Not on file   Housing Stability: Not on file          MEDICATIONS:  has a current medication list which includes the following prescription(s): azelaic acid, bupropion, buspirone, clobetasol, fluticasone propionate, hydroxyzine, methylphenidate, spironolactone, tazarotene, clindamycin, fexofenadine, ondansetron, and sodium chloride.    ROS:     ROS: 10 point ROS neg other than the symptoms  noted above in the HPI.    GENERAL: some fatigue, wt stable; denies fevers, chills, night sweats.    HEENT: no dysphagia, odonophagia, diplopia, neck pain  THYROID:  no apparent hyper or hypothyroid symptoms  CV: no chest pain, pressure, palpitations  LUNGS: no SOB, ERNANDEZ, cough, wheezing   ABDOMEN: no diarrhea, constipation, abdominal pain  EXTREMITIES: no rashes, ulcers, edema  NEUROLOGY: no headaches, denies changes in vision, tingling, extremitiy numbness   MSK: generalized arthralgias- may relate to rock climbing hobby; denies muscle weakness  SKIN: no rashes or lesions  : irreg menses as noted; denies dysuria or nocturia  PSYCH:  stable mood, no significant anxiety or depression  ENDOCRINE: no heat or cold intolerance    Physical Exam (visual exam)  VS:  no vital signs taken for video visit  CONSTITUTIONAL: healthy, alert and NAD, well dressed, answering questions appropriately  ENT: no nose swelling or nasal discharge, mouth redness or gum changes.  EYES: eyes grossly normal to inspection, conjunctivae and sclerae normal, no exophthalmos or proptosis  THYROID:  no apparent nodules or goiter  LUNGS: no audible wheeze, cough or visible cyanosis, no visible retractions or increased work of breathing  ABDOMEN: abdomen not evaluated  EXTREMITIES: no hand tremors, limited exam  NEUROLOGY: CN grossly intact, mentation intact and speech normal   SKIN:  no apparent skin lesions, rash, or edema with visualized skin appearance  PSYCH: mentation appears normal, affect normal/bright, judgement and insight intact,   normal speech and appearance well groomed      LABS:    All pertinent notes, labs, and images personally reviewed by me.     A/P:  Encounter Diagnosis   Name Primary?    Irregular periods Yes     Comments:   Reviewed health history and menstrual cycling issues  Difficult to assess hormone testing with limited medical records available  DDx includes estrogen lab error, POF, secondary amenorrhea,  PCOS    Plan:  Discussed general issues with the irregular menstrual cycling  Reviewed pituitary gland anatomy and hormone physiology  We reviewed estrogen level testing, potential causes of low estrogen  Discussed lab tests used to assess patient pituitary-axis hormone levels    Recommend:  Advised completing additional hormone testing   Testing at [facility] lab   Lab orders placed  Monitor for symptom changes  Track menstrual cycling  Reasonable to continue the spironolactone med use if helpful for acne  Consider having a pelvic U/S to assess ovary anatomy, possible ovary cysts  Will summarize test results and conclusions when lab info available    Addressed patient questions today    There are no Patient Instructions on file for this visit.    Future labs ordered today:   Orders Placed This Encounter   Procedures    Estradiol    Prolactin    Follicle stimulating hormone    Luteinizing Hormone    Testosterone total    Progesterone    TSH     Radiology/Consults ordered today:     Total time spent on day of encounter:  37 min    Follow-up:  9/26/23 at 4p, Rachel Dorantes MD, MS  Endocrinology  [facility]    CC: Willa Merrill [facility]

## 2023-08-31 ENCOUNTER — VIRTUAL VISIT (OUTPATIENT)
Dept: ENDOCRINOLOGY | Facility: CLINIC | Age: 28
End: 2023-08-31
Payer: COMMERCIAL

## 2023-08-31 DIAGNOSIS — N92.6 IRREGULAR PERIODS: Primary | ICD-10-CM

## 2023-08-31 PROCEDURE — 99243 OFF/OP CNSLTJ NEW/EST LOW 30: CPT | Mod: VID | Performed by: INTERNAL MEDICINE

## 2023-08-31 NOTE — Clinical Note
8/31/2023         RE: Rubi Gonsales  [address]  [place] [address]        Dear Colleague,    Thank you for referring your patient, Rubi Gonsales, to the [facility] [place]. Please see a copy of my visit note below.    Virtual Visit Details    Type of service:  Video Visit     Originating Location (pt. Location):   Distant Location (provider location):  Off-site  Platform used for Video Visit: [employer]      Name: Rubi Gonsales is a 27 year old woman, seen at the request of Willa Merrill CNP for evaluation of     Chief Complaint   Patient presents with    Endocrine Problem     Low estrogen ?       HPI:  Recent issues:  Here for evaluation of low estrogen level  Reviewed medical history from patient and Epic chart record        Menarche age 10-13  She recalls initial menstrual cycling regular  Age 13- started OCP med for acne management, continued OCP ages [age] yoa  Also had acne Accutane treatment x2 courses    ~10/2022. Started spironolactone 100 mg daily for acne  Medical evaluation with Willa Merrill CNP/[facility]  7/2022. Started use of copper IUD  Recalls having 2 regular menstrual cycles  10/2022. Began having irregular menstrual cycling, spotting  1/2023-2/2023. Regular menstrual cycles  3/2023. Lab testing included low estradiol levels  4/2023-8/2023. No menstrual cycling  LMP late 8/2023    Denies hot flashes or night sweats, headaches, hirsutism, breast symptoms  Some arthralgias... 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    PMH/PSH:  Past Medical History:   Diagnosis Date    Acne     Anxiety     Depression     Irregular menses      Past Surgical History:   Procedure Laterality Date    ENDOSCOPIC REDUCTION, INFERIOR NASAL TURBINATE  Bilateral 6/29/2023    Procedure: ENDOSCOPIC REDUCTION, INFERIOR NASAL TURBINATE  ENDOSCOPIC;  Surgeon: Clark Sykes MD;  Location: [facility] OR    NO HISTORY OF SURGERY         Family Hx:  Family History   Problem Relation Age of Onset    No Known Problems Mother     No Known Problems Father     No Known Problems Sister     Hypertension Brother     Hypertension Maternal Grandmother     No Known Problems Maternal Grandfather     No Known Problems Paternal Grandmother     No Known Problems Other          Social Hx:  Social History     Socioeconomic History    Marital status: Single     Spouse name: Not on file    Number of children: Not on file    Years of education: Not on file    Highest education level: Not on file   Occupational History    Not on file   Tobacco Use    Smoking status: Never    Smokeless tobacco: Never   Substance and Sexual Activity    Alcohol use: Yes    Drug use: Never    Sexual activity: Yes     Partners: Male     Birth control/protection: Inserts/Ring   Other Topics Concern    Parent/sibling w/ CABG, MI or angioplasty before 65F 55M? Not Asked   Social History Narrative    Not on file     Social Determinants of Health     Financial Resource Strain: Not on file   Food Insecurity: Not on file   Transportation Needs: Not on file   Physical Activity: Not on file   Stress: Not on file   Social Connections: Not on file   Intimate Partner Violence: Not on file   Housing Stability: Not on file          MEDICATIONS:  has a current medication list which includes the following  prescription(s): azelaic acid, bupropion, buspirone, clobetasol, fluticasone propionate, hydroxyzine, methylphenidate, spironolactone, tazarotene, clindamycin, fexofenadine, ondansetron, and sodium chloride.    ROS:     ROS: 10 point ROS neg other than the symptoms noted above in the HPI.    GENERAL: some fatigue, wt stable; denies fevers, chills, night sweats.    HEENT: no dysphagia, odonophagia, diplopia, neck pain  THYROID:  no apparent hyper or hypothyroid symptoms  CV: no chest pain, pressure, palpitations  LUNGS: no SOB, ERNANDEZ, cough, wheezing   ABDOMEN: no diarrhea, constipation, abdominal pain  EXTREMITIES: no rashes, ulcers, edema  NEUROLOGY: no headaches, denies changes in vision, tingling, extremitiy numbness   MSK: generalized arthralgias- may relate to rock climbing hobby; denies muscle weakness  SKIN: no rashes or lesions  : irreg menses as noted; denies dysuria or nocturia  PSYCH:  stable mood, no significant anxiety or depression  ENDOCRINE: no heat or cold intolerance    Physical Exam (visual exam)  VS:  no vital signs taken for video visit  CONSTITUTIONAL: healthy, alert and NAD, well dressed, answering questions appropriately  ENT: no nose swelling or nasal discharge, mouth redness or gum changes.  EYES: eyes grossly normal to inspection, conjunctivae and sclerae normal, no exophthalmos or proptosis  THYROID:  no apparent nodules or goiter  LUNGS: no audible wheeze, cough or visible cyanosis, no visible retractions or increased work of breathing  ABDOMEN: abdomen not evaluated  EXTREMITIES: no hand tremors, limited exam  NEUROLOGY: CN grossly intact, mentation intact and speech normal   SKIN:  no apparent skin lesions, rash, or edema with visualized skin appearance  PSYCH: mentation appears normal, affect normal/bright, judgement and insight intact,   normal speech and appearance well groomed      LABS:    All pertinent notes, labs, and images personally reviewed by me.     A/P:  Encounter  Diagnosis   Name Primary?    Irregular periods Yes     Comments:   Reviewed health history and menstrual cycling issues  Difficult to assess hormone testing with limited medical records available  DDx includes estrogen lab error, POF, secondary amenorrhea, PCOS    Plan:  Discussed general issues with the irregular menstrual cycling  Reviewed pituitary gland anatomy and hormone physiology  We reviewed estrogen level testing, potential causes of low estrogen  Discussed lab tests used to assess patient pituitary-axis hormone levels    Recommend:  Advised completing additional hormone testing   Testing at [facility] lab   Lab orders placed  Monitor for symptom changes  Track menstrual cycling  Reasonable to continue the spironolactone med use if helpful for acne  Consider having a pelvic U/S to assess ovary anatomy, possible ovary cysts  Will summarize test results and conclusions when lab info available    Addressed patient questions today    There are no Patient Instructions on file for this visit.    Future labs ordered today:   Orders Placed This Encounter   Procedures    Estradiol    Prolactin    Follicle stimulating hormone    Luteinizing Hormone    Testosterone total    Progesterone    TSH     Radiology/Consults ordered today:     Total time spent on day of encounter:  ***    Follow-up:  9/26/23 at , Rachel Dorantes MD, MS  Endocrinology  [facility]    CC: Willa Merrill CNP/[facility]      Virtual Visit Details    Type of service:  Video Visit     Originating Location (pt. Location):   Distant Location (provider location):  Off-site  Platform used for Video Visit: Well      Name: Rubi Gonsales is a 27 year old woman, seen at the request of Willa Merrill CNP for evaluation of     Chief Complaint   Patient presents with     Endocrine Problem     Low estrogen ?       HPI:  Recent issues:  Here for evaluation of low estrogen level  Reviewed medical history from patient  and Epic chart record        Menarche age 12-13  She recalls initial menstrual cycling regular  Age 13- started OCP med for acne management, continued OCP ages [age] yoa  Also had acne Accutane treatment x2 courses    ~10/2022. Started spironolactone 100 mg daily for acne  Medical evaluation with Willa Merrill [facility]  7/2022. Started use of copper IUD  Recalls having 2 regular menstrual cycles  10/2022. Began having irregular menstrual cycling, spotting  1/2023-2/2023. Regular menstrual cycles  3/2023. Lab testing included low estradiol levels  4/2023-8/2023. No menstrual cycling  LMP late 8/2023    Denies hot flashes or night sweats, headaches, hirsutism, breast symptoms  Some arthralgias... 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  Sees Willa Merrill [facility] for general medicine evaluations.    PMH/PSH:  Past Medical History:   Diagnosis Date     Acne      Anxiety      Depression      Irregular menses      Past Surgical History:   Procedure Laterality Date     ENDOSCOPIC REDUCTION, INFERIOR NASAL TURBINATE  Bilateral 6/29/2023    Procedure: ENDOSCOPIC REDUCTION, INFERIOR NASAL TURBINATE  ENDOSCOPIC;  Surgeon: Clark Sykes MD;  Location: [facility] OR     NO HISTORY OF SURGERY         Family Hx:  Family History   Problem Relation Age of Onset     No Known Problems Mother      No Known Problems Father      No Known Problems Sister       Hypertension Brother      Hypertension Maternal Grandmother      No Known Problems Maternal Grandfather      No Known Problems Paternal Grandmother      No Known Problems Other          Social Hx:  Social History     Socioeconomic History     Marital status: Single     Spouse name: Not on file     Number of children: Not on file     Years of education: Not on file     Highest education level: Not on file   Occupational History     Not on file   Tobacco Use     Smoking status: Never     Smokeless tobacco: Never   Substance and Sexual Activity     Alcohol use: Yes     Drug use: Never     Sexual activity: Yes     Partners: Male     Birth control/protection: Inserts/Ring   Other Topics Concern     Parent/sibling w/ CABG, MI or angioplasty before 65F 55M? Not Asked   Social History Narrative     Not on file     Social Determinants of Health     Financial Resource Strain: Not on file   Food Insecurity: Not on file   Transportation Needs: Not on file   Physical Activity: Not on file   Stress: Not on file   Social Connections: Not on file   Intimate Partner Violence: Not on file   Housing Stability: Not on file          MEDICATIONS:  has a current medication list which includes the following prescription(s): azelaic acid, bupropion, buspirone, clobetasol, fluticasone propionate, hydroxyzine, methylphenidate, spironolactone, tazarotene, clindamycin, fexofenadine, ondansetron, and sodium chloride.    ROS:     ROS: 10 point ROS neg other than the symptoms noted above in the HPI.    GENERAL: some fatigue, wt stable; denies fevers, chills, night sweats.    HEENT: no dysphagia, odonophagia, diplopia, neck pain  THYROID:  no apparent hyper or hypothyroid symptoms  CV: no chest pain, pressure, palpitations  LUNGS: no SOB, ERNANDEZ, cough, wheezing   ABDOMEN: no diarrhea, constipation, abdominal pain  EXTREMITIES: no rashes, ulcers, edema  NEUROLOGY: no headaches, denies changes in vision, tingling, extremitiy numbness   MSK: generalized  arthralgias- may relate to rock climbing hobby; denies muscle weakness  SKIN: no rashes or lesions  : irreg menses as noted; denies dysuria or nocturia  PSYCH:  stable mood, no significant anxiety or depression  ENDOCRINE: no heat or cold intolerance    Physical Exam (visual exam)  VS:  no vital signs taken for video visit  CONSTITUTIONAL: healthy, alert and NAD, well dressed, answering questions appropriately  ENT: no nose swelling or nasal discharge, mouth redness or gum changes.  EYES: eyes grossly normal to inspection, conjunctivae and sclerae normal, no exophthalmos or proptosis  THYROID:  no apparent nodules or goiter  LUNGS: no audible wheeze, cough or visible cyanosis, no visible retractions or increased work of breathing  ABDOMEN: abdomen not evaluated  EXTREMITIES: no hand tremors, limited exam  NEUROLOGY: CN grossly intact, mentation intact and speech normal   SKIN:  no apparent skin lesions, rash, or edema with visualized skin appearance  PSYCH: mentation appears normal, affect normal/bright, judgement and insight intact,   normal speech and appearance well groomed      LABS:    All pertinent notes, labs, and images personally reviewed by me.     A/P:  Encounter Diagnosis   Name Primary?     Irregular periods Yes     Comments:   Reviewed health history and menstrual cycling issues  Difficult to assess hormone testing with limited medical records available  DDx includes estrogen lab error, POF, secondary amenorrhea, PCOS    Plan:  Discussed general issues with the irregular menstrual cycling  Reviewed pituitary gland anatomy and hormone physiology  We reviewed estrogen level testing, potential causes of low estrogen  Discussed lab tests used to assess patient pituitary-axis hormone levels    Recommend:  Advised completing additional hormone testing   Testing at [facility] lab   Lab orders placed  Monitor for symptom changes  Track menstrual cycling  Reasonable to continue the spironolactone med use if  helpful for acne  Consider having a pelvic U/S to assess ovary anatomy, possible ovary cysts  Will summarize test results and conclusions when lab info available    Addressed patient questions today    There are no Patient Instructions on file for this visit.    Future labs ordered today:   Orders Placed This Encounter   Procedures     Estradiol     Prolactin     Follicle stimulating hormone     Luteinizing Hormone     Testosterone total     Progesterone     TSH     Radiology/Consults ordered today:     Total time spent on day of encounter:  37 min    Follow-up:  9/26/23 at , Pauline Dorantes MD, MS  Endocrinology  [facility]    CC: Willa Merrill [facility]        Again, thank you for allowing me to participate in the care of your patient.        Sincerely,        Sachin Dorantes MD

## 2023-09-07 ENCOUNTER — LAB (OUTPATIENT)
Dept: LAB | Facility: CLINIC | Age: 28
End: 2023-09-07
Payer: COMMERCIAL

## 2023-09-07 DIAGNOSIS — N92.6 IRREGULAR PERIODS: ICD-10-CM

## 2023-09-07 LAB
ESTRADIOL SERPL-MCNC: 34 PG/ML
FSH SERPL IRP2-ACNC: 7.8 MIU/ML
LH SERPL-ACNC: 7.3 MIU/ML
PROGEST SERPL-MCNC: 0.4 NG/ML
PROLACTIN SERPL 3RD IS-MCNC: 7 NG/ML (ref 5–23)
TSH SERPL DL<=0.005 MIU/L-ACNC: 2.13 UIU/ML (ref 0.3–4.2)

## 2023-09-07 PROCEDURE — 82670 ASSAY OF TOTAL ESTRADIOL: CPT | Performed by: INTERNAL MEDICINE

## 2023-09-07 PROCEDURE — 84144 ASSAY OF PROGESTERONE: CPT | Performed by: INTERNAL MEDICINE

## 2023-09-07 PROCEDURE — 84403 ASSAY OF TOTAL TESTOSTERONE: CPT | Performed by: INTERNAL MEDICINE

## 2023-09-07 PROCEDURE — 83002 ASSAY OF GONADOTROPIN (LH): CPT | Performed by: INTERNAL MEDICINE

## 2023-09-07 PROCEDURE — 83001 ASSAY OF GONADOTROPIN (FSH): CPT | Performed by: INTERNAL MEDICINE

## 2023-09-07 PROCEDURE — 99000 SPECIMEN HANDLING OFFICE-LAB: CPT | Performed by: PATHOLOGY

## 2023-09-07 PROCEDURE — 84146 ASSAY OF PROLACTIN: CPT | Performed by: INTERNAL MEDICINE

## 2023-09-07 PROCEDURE — 36415 COLL VENOUS BLD VENIPUNCTURE: CPT | Performed by: PATHOLOGY

## 2023-09-07 PROCEDURE — 84443 ASSAY THYROID STIM HORMONE: CPT | Performed by: PATHOLOGY

## 2023-09-09 LAB — TESTOST SERPL-MCNC: 11 NG/DL (ref 8–60)

## 2023-09-26 ENCOUNTER — VIRTUAL VISIT (OUTPATIENT)
Dept: ENDOCRINOLOGY | Facility: CLINIC | Age: 28
End: 2023-09-26
Payer: COMMERCIAL

## 2023-09-26 DIAGNOSIS — N92.6 IRREGULAR PERIODS: Primary | ICD-10-CM

## 2023-09-26 PROCEDURE — 99213 OFFICE O/P EST LOW 20 MIN: CPT | Mod: VID | Performed by: INTERNAL MEDICINE

## 2023-09-26 NOTE — PROGRESS NOTES
Virtual Visit Details    Type of service:  Video Visit     Originating Location (pt. Location): Home  Distant Location (provider location):  Off-site  Platform used for Video Visit: [employer]        Recent issues:  Low estrogen, menstrual cycling follow-up  Reviewed medical history from patient and Epic chart record        Menarche age 10-13  She recalls initial menstrual cycling regular  Age 13- started OCP med for acne management, continued OCP ages [age] yoa  Also had acne Accutane treatment x2 courses    ~10/2022. Started spironolactone 100 mg daily for acne  Medical evaluation with [facility]  7/2022. Started use of copper IUD  Recalls having 2 regular menstrual cycles  10/2022. Began having irregular menstrual cycling, spotting  1/2023-2/2023. Regular menstrual cycles  3/2023. Lab testing included low estradiol levels  4/2023-8/2023. No menstrual cycling  LMP late 8/2023    Denies hot flashes or night sweats, headaches, hirsutism, breast symptoms  Some arthralgias... may be due to rock climbing    Previous [facility] labs include:  3/21/23 Estradiol, Free 0.28 pg/mL (Follicular 0.43-5.03, Luteal 0.4-5.55, postmenopausal < 0.38),   Estradiol 14 pg/mL(Follicular ), Luteal , postmenopausal <10)    Additional history:  Pituitary dis None  Hirsutism: None  Pelvic U/S None known      8/31/23. Initial endocrinology evaluation with me at [facility]  Reviewed health history information  Additional labs: normal estradiol estrogen, progesterone, prolactin, testosterone, TSH, LH, and FSH hormone levels.     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    PMH/PSH:  Past Medical History:   Diagnosis Date    Acne      Anxiety     Depression     Irregular menses      Past Surgical History:   Procedure Laterality Date    ENDOSCOPIC REDUCTION, INFERIOR NASAL TURBINATE  Bilateral 6/29/2023    Procedure: ENDOSCOPIC REDUCTION, INFERIOR NASAL TURBINATE  ENDOSCOPIC;  Surgeon: Clark Sykes MD;  Location: [facility] OR    NO HISTORY OF SURGERY         Family Hx:  Family History   Problem Relation Age of Onset    No Known Problems Mother     No Known Problems Father     No Known Problems Sister     Hypertension Brother     Hypertension Maternal Grandmother     No Known Problems Maternal Grandfather     No Known Problems Paternal Grandmother     No Known Problems Other        Social Hx:  Social History     Socioeconomic History    Marital status: Single     Spouse name: Not on file    Number of children: Not on file    Years of education: Not on file    Highest education level: Not on file   Occupational History    Not on file   Tobacco Use    Smoking status: Never    Smokeless tobacco: Never   Substance and Sexual Activity    Alcohol use: Yes    Drug use: Never    Sexual activity: Yes     Partners: Male     Birth control/protection: Inserts/Ring   Other Topics Concern    Parent/sibling w/ CABG, MI or angioplasty before 65F 55M? Not Asked   Social History Narrative    Not on file     Social Determinants of Health     Financial Resource Strain: Not on file   Food Insecurity: Not on file   Transportation Needs: Not on file   Physical Activity: Not on file   Stress: Not on file   Social Connections: Not on file   Interpersonal Safety: Not on file   Housing Stability: Not on file          MEDICATIONS:  has a current medication list which includes the following prescription(s): azelaic acid, bupropion, buspirone, clindamycin, clobetasol, fexofenadine, fluticasone propionate, hydroxyzine, methylphenidate, ondansetron, sodium chloride, spironolactone, and tazarotene.    ROS:     ROS: 10 point ROS neg other than the symptoms noted above in the  HPI.    GENERAL: some fatigue, wt stable; denies fevers, chills, night sweats.    HEENT: no dysphagia, odonophagia, diplopia, neck pain  THYROID:  no apparent hyper or hypothyroid symptoms  CV: no chest pain, pressure, palpitations  LUNGS: no SOB, ERNANDEZ, cough, wheezing   ABDOMEN: no diarrhea, constipation, abdominal pain  EXTREMITIES: no rashes, ulcers, edema  NEUROLOGY: no headaches, denies changes in vision, tingling, extremitiy numbness   MSK: generalized arthralgias- may relate to rock climbing hobby; denies muscle weakness  SKIN: no rashes or lesions  : irreg menses as noted; denies dysuria or nocturia  PSYCH:  stable mood, no significant anxiety or depression  ENDOCRINE: no heat or cold intolerance    Physical Exam (visual exam)  VS:  no vital signs taken for video visit  CONSTITUTIONAL: healthy, alert and NAD, well dressed, answering questions appropriately  ENT: no nose swelling or nasal discharge, mouth redness or gum changes.  EYES: eyes grossly normal to inspection, conjunctivae and sclerae normal, no exophthalmos or proptosis  THYROID:  no apparent nodules or goiter  LUNGS: no audible wheeze, cough or visible cyanosis, no visible retractions or increased work of breathing  ABDOMEN: abdomen not evaluated  EXTREMITIES: no hand tremors, limited exam  NEUROLOGY: CN grossly intact, mentation intact and speech normal   SKIN:  no apparent skin lesions, rash, or edema with visualized skin appearance  PSYCH: mentation appears normal, affect normal/bright, judgement and insight intact,   normal speech and appearance well groomed      LABS:    All pertinent notes, labs, and images personally reviewed by me.     A/P:  Encounter Diagnosis   Name Primary?    Irregular periods Yes     Comments:   Reviewed health history and menstrual cycling issues  Repeat hormone testing normal, reassuring  I suspect the irregular menstrual bleeding due to the copper IUD placement  since intermenstrual spotting or heavy or prolonged  menstrual bleeding, are common among copper-containing intrauterine device users      Plan:  Discussed general issues with the irregular menstrual cycling  We reviewed estrogen level testing, potential causes of low estrogen  Discussed lab tests used to assess patient pituitary-axis hormone levels    Recommend:  Reviewed management options with continuing copper IUD and oral spironolactone, tolerating irreg menses or   Having IUD removed to track menstrual cycling or   IUD removed and restarting OCP med use  She wishes to continue the current management plan, per her PCP provider  Reasonable to continue the spironolactone med use if helpful for acne  Monitor for symptom changes  Plan regular follow-up with her PCP or GYN provider    Addressed patient questions today    There are no Patient Instructions on file for this visit.    Future labs ordered today:   No orders of the defined types were placed in this encounter.    Radiology/Consults ordered today:     Total time spent on day of encounter:  21 min    Follow-up:  jessica Dorantes MD, MS  Endocrinology  [facility]    CC: Willa Merrill [facility]

## 2023-09-26 NOTE — Clinical Note
9/26/2023         RE: Rubi Gonsales  [address]  [place] [address]        Dear Colleague,    Thank you for referring your patient, Rubi Gonsales, to the [facility] [place]. Please see a copy of my visit note below.    Virtual Visit Details    Type of service:  Video Visit     Originating Location (pt. Location): Home  Distant Location (provider location):  Off-site  Platform used for Video Visit: Rodriguez        Recent issues:  Low estrogen, menstrual cycling follow-up  Reviewed medical history from patient and Epic chart record        Menarche age 10-13  She recalls initial menstrual cycling regular  Age 13- started OCP med for acne management, continued OCP ages [age] yoa  Also had acne Accutane treatment x2 courses    ~10/2022. Started spironolactone 100 mg daily for acne  Medical evaluation with [facility]  7/2022. Started use of copper IUD  Recalls having 2 regular menstrual cycles  10/2022. Began having irregular menstrual cycling, spotting  1/2023-2/2023. Regular menstrual cycles  3/2023. Lab testing included low estradiol levels  4/2023-8/2023. No menstrual cycling  LMP late 8/2023    Denies hot flashes or night sweats, headaches, hirsutism, breast symptoms  Some arthralgias... may be due to rock climbing    Previous [facility] labs include:  3/21/23 Estradiol, Free 0.28 pg/mL (Follicular 0.43-5.03, Luteal 0.4-5.55, postmenopausal < 0.38),   Estradiol 14 pg/mL(Follicular ), Luteal , postmenopausal <10)    Additional history:  Pituitary dis None  Hirsutism: None  Pelvic U/S None known      8/31/23. Initial endocrinology evaluation with me at [facility]  Reviewed health history information  Additional labs: normal estradiol estrogen, progesterone, prolactin, testosterone, TSH, LH, and FSH hormone levels.     Lab Results   Component Value Date    FSH 7.8 09/07/2023    ESTROGEN 34 09/07/2023    TESTOSTTOTAL 11  09/07/2023    TSH 2.13 09/07/2023    SG 1.020 03/06/2020            Lives in [place], [place], PhD student at [employer]  Sees [facility] for general medicine evaluations.    PMH/PSH:  Past Medical History:   Diagnosis Date    Acne     Anxiety     Depression     Irregular menses      Past Surgical History:   Procedure Laterality Date    ENDOSCOPIC REDUCTION, INFERIOR NASAL TURBINATE  Bilateral 6/29/2023    Procedure: ENDOSCOPIC REDUCTION, INFERIOR NASAL TURBINATE  ENDOSCOPIC;  Surgeon: Clark Sykes MD;  Location: [facility] OR    NO HISTORY OF SURGERY         Family Hx:  Family History   Problem Relation Age of Onset    No Known Problems Mother     No Known Problems Father     No Known Problems Sister     Hypertension Brother     Hypertension Maternal Grandmother     No Known Problems Maternal Grandfather     No Known Problems Paternal Grandmother     No Known Problems Other        Social Hx:  Social History     Socioeconomic History    Marital status: Single     Spouse name: Not on file    Number of children: Not on file    Years of education: Not on file    Highest education level: Not on file   Occupational History    Not on file   Tobacco Use    Smoking status: Never    Smokeless tobacco: Never   Substance and Sexual Activity    Alcohol use: Yes    Drug use: Never    Sexual activity: Yes     Partners: Male     Birth control/protection: Inserts/Ring   Other Topics Concern    Parent/sibling w/ CABG, MI or angioplasty before 65F 55M? Not Asked   Social History Narrative    Not on file     Social Determinants of Health     Financial Resource Strain: Not on file   Food Insecurity: Not on file   Transportation Needs: Not on file   Physical Activity: Not on file   Stress: Not on file   Social Connections: Not on file   Interpersonal Safety: Not on file   Housing Stability: Not on file          MEDICATIONS:  has a current medication list which includes the following prescription(s):  azelaic acid, bupropion, buspirone, clindamycin, clobetasol, fexofenadine, fluticasone propionate, hydroxyzine, methylphenidate, ondansetron, sodium chloride, spironolactone, and tazarotene.    ROS:     ROS: 10 point ROS neg other than the symptoms noted above in the HPI.    GENERAL: some fatigue, wt stable; denies fevers, chills, night sweats.    HEENT: no dysphagia, odonophagia, diplopia, neck pain  THYROID:  no apparent hyper or hypothyroid symptoms  CV: no chest pain, pressure, palpitations  LUNGS: no SOB, ERNANDEZ, cough, wheezing   ABDOMEN: no diarrhea, constipation, abdominal pain  EXTREMITIES: no rashes, ulcers, edema  NEUROLOGY: no headaches, denies changes in vision, tingling, extremitiy numbness   MSK: generalized arthralgias- may relate to rock climbing hobby; denies muscle weakness  SKIN: no rashes or lesions  : irreg menses as noted; denies dysuria or nocturia  PSYCH:  stable mood, no significant anxiety or depression  ENDOCRINE: no heat or cold intolerance    Physical Exam (visual exam)  VS:  no vital signs taken for video visit  CONSTITUTIONAL: healthy, alert and NAD, well dressed, answering questions appropriately  ENT: no nose swelling or nasal discharge, mouth redness or gum changes.  EYES: eyes grossly normal to inspection, conjunctivae and sclerae normal, no exophthalmos or proptosis  THYROID:  no apparent nodules or goiter  LUNGS: no audible wheeze, cough or visible cyanosis, no visible retractions or increased work of breathing  ABDOMEN: abdomen not evaluated  EXTREMITIES: no hand tremors, limited exam  NEUROLOGY: CN grossly intact, mentation intact and speech normal   SKIN:  no apparent skin lesions, rash, or edema with visualized skin appearance  PSYCH: mentation appears normal, affect normal/bright, judgement and insight intact,   normal speech and appearance well groomed      LABS:    All pertinent notes, labs, and images personally reviewed by me.     A/P:  Encounter Diagnosis   Name  Primary?    Irregular periods Yes     Comments:   Reviewed health history and menstrual cycling issues  Repeat hormone testing normal, reassuring  I suspect the irregular menstrual bleeding due to the copper IUD placement  since intermenstrual spotting or heavy or prolonged menstrual bleeding, are common among copper-containing intrauterine device users      Plan:  Discussed general issues with the irregular menstrual cycling  We reviewed estrogen level testing, potential causes of low estrogen  Discussed lab tests used to assess patient pituitary-axis hormone levels    Recommend:  Reviewed management options with continuing copper IUD and oral spironolactone, tolerating irreg menses or   Having IUD removed to track menstrual cycling or   IUD removed and restarting OCP med use  She wishes to continue the current management plan, per her PCP provider  Reasonable to continue the spironolactone med use if helpful for acne  Monitor for symptom changes  Plan regular follow-up with her PCP or GYN provider    Addressed patient questions today    There are no Patient Instructions on file for this visit.    Future labs ordered today:   No orders of the defined types were placed in this encounter.    Radiology/Consults ordered today:     Total time spent on day of encounter:  21 min    Follow-up:  jessica Dorantes MD, MS  Endocrinology  [facility]    CC: [facility]      Again, thank you for allowing me to participate in the care of your patient.        Sincerely,        Sachin Dorantes MD

## 2024-02-08 NOTE — PROGRESS NOTES
"Virtual Visit Details    Type of service:  Video Visit     Originating Location (pt. Location): Home    Distant Location (provider location):  Off-site  Platform used for Video Visit: Rodriguez    Rubi Gonsales is a 28 year old female who is being evaluated via a billable video visit.       The patient has been notified of following:      \"This video visit will be conducted via a call between you and your physician/provider. We have found that certain health care needs can be provided without the need for an in-person physical exam.  This service lets us provide the care you need with a video conversation.  If a prescription is necessary we can send it directly to your pharmacy.  If lab work is needed we can place an order for that and you can then stop by our lab to have the test done at a later time.     Video visits are billed at different rates depending on your insurance coverage.  Please reach out to your insurance provider with any questions.     If during the course of the call the physician/provider feels a video visit is not appropriate, you will not be charged for this service.\"     Patient has given verbal consent for Video visit? Yes  How would you like to obtain your AVS? Mail a copy  If you are dropped from the video visit, the video invite should be resent to: Text to cell phone: -  Will anyone else be joining your video visit? No  If patient encounters technical issues they should call [phone]      Video-Visit Details     Type of service:  Video Visit     Start Time:  3:35pm  End Time: 3:55 PM    Originating Location (pt. Location): Home     Distant Location (provider location):  Off-site, [facility]       Platform used for Video Visit: PaulineWell    Virtual visit for narcolepsy without cataplexy.     Assessment:  - Actigraphy suggestive of adequate total sleep time, but also suspect longer sleep need and may represent inadequate sleep time with very short latencies.  - PSG " without ABHIJEET, rare PLM's.  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     We spent the majority of our visit discussing narcolepsy, the long-term management including pharmacotherapy, strategic napping.  We discussed the importance of adequate daily sleep time.     Overall, hypersomnia appears well-controlled with currently prescribed bupropion   daily and methylphenidate immediate release 10 mg tablets, taking half-1 tablet twice daily-3 times daily as needed.     Plan for follow-up in 6-12 months or sooner if needed.       SUBJECTIVE:  Rubi Gonsales is a 28 year old female.    Referred by Nydia Pretty at [facility] for chronic daytime fatigue / sleepiness.     Pertinent PMHx of depression, anxiety, acne.     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  Note of life long issues of fatigue, worse over past year.  No significant change in adjustment of selective serotonin reuptake inhibitor from fluoxetine to lexapro, dosing AM vs PM, trial off medication.  Note of significant difficulty with sleepiness on recent 4 hour drive to [facility], had to stop every 30 minutes.  Reporting 8+ hours of sleep, trial of sleeping adlib from 8:30pm - 7am with only mild improvement.     5/14/2021 -we reviewed her history in detail.  She first feels she started noticing having excessive and inappropriate daytime sleepiness and undergraduate, difficulty staying awake and various types of classes and more challenging workload.  She eventually had some lesser symptoms in high school, or possibly were less noticeable due to a less rigorous classes.  She has most significantly noted sleepiness since the start of graduate school,  with the increased amount of reading and research time.  She feels her most challenging times her symptoms are between noon and 6 PM.  She reports that she has had some previous blood work, including a normal thyroid test.     During the school week, her sleep-wake pattern is to go to bed at 10 PM and fall asleep quickly, no frequent or prolonged awakenings, up at 6 AM by alarm.  On weekends she feels she can sleep for as long as 12 hours if allowed.  She has meets her average total sleep time to 8-10+ hours.     Denies snoring.  Denies observed apnea.  Occasional sleep talking.  Denies any sleepwalking or dream enacting behavior.     Denies sleep paralysis, denies hypnopompic or hypnagogic hallucinations.  She answers to potentially very subtle cataplexy, but is not clear.     Family history: No sleep disorders, hypertension, thyroid disease.     Caffeine use: Less than 3/day, may have 1 caffeinated drink in the early afternoon.     Alcohol use: Minimal, worsening sleepiness.     Other drug use: None     ESS 17.     She is pursuing a PhD in archaeology, and recently was collecting graft samples.     A/P for actigraphy, PSG, MSLT.     11/12/2021 -we reviewed her actigraphy, polysomnogram and multiple sleep latency testing in detail today.    A/P for overall diagnosis of narcolepsy without cataplexy, plan for start of trial of methylphenidate IR 5-10mg BID-TID PRN.    Today -it is noted that her PHQ 9 score had a total of 11 and she did answer yes initially to the question #9 regarding suicidal ideation.  She did speak to the registered nurse on the triage line before visit, and we also reviewed her current mental health.  In general, she does not report an active suicidal ideation or plan, she describes it more as an overall general frustration as she is completing her PhD, challenges of completing a dissertation, looking for postdoc opportunities.  She does not feel that she is a danger to herself or others.    In  general, her sleepiness has been well-controlled with methylphenidate immediate release 5-10 mg taken up to 2-3 times daily as needed, though typically this is once daily at most.  Most frequently, she will take it with longer drives and also if she will be working for a prolonged period of time on the computer.  She did have a change in her depression regiment with discontinuation of sertraline and started bupropion.  She did notice a significant improvement in sleepiness with start of bupropion.      Past medical history:    Patient Active Problem List    Diagnosis Date Noted    Nasal obstruction 06/19/2023     Priority: Medium     Added automatically from request for surgery [id number]      Onycholysis 05/13/2022     Priority: Medium    Narcolepsy without cataplexy(347.00) 11/12/2021     Priority: Medium       10 point ROS of systems including Constitutional, Eyes, Respiratory, Cardiovascular, Gastroenterology, Genitourinary, Integumentary, Muscularskeletal, Psychiatric were all negative except for pertinent positives noted in my HPI.    Current Outpatient Medications   Medication Sig Dispense Refill    azelaic acid (FINACIA) 15 % external gel Apply to face every morning to perioral dermatitis areas      buPROPion (WELLBUTRIN XL) 150 MG 24 hr tablet TAKE ONE TABLET BY MOUTH EACH MORNING.      busPIRone (BUSPAR) 10 MG tablet       clindamycin (CLEOCIN T) 1 % external lotion Apply topically to face every morning. Use with OTC benozyl peroxide wash. (Patient not taking: Reported on 8/31/2023)      clobetasol (TEMOVATE) 0.05 % external ointment APPLY TO VULVA TWICE DAILY      fexofenadine (QC FEXOFENADINE HYDROCHLORIDE) 180 MG tablet  (Patient not taking: Reported on 6/29/2023)      FLUTICASONE PROPIONATE, NASAL, NA       hydrOXYzine (ATARAX) 25 MG tablet TAKE 1-2 TABLETS BY MOUTH NIGHTLY AT BEDTIME AS NEEDED FOR SLEEP      methylphenidate (RITALIN) 10 MG tablet Take 1/2 - 1 tablet by mouth two to three times daily as  needed for sleepiness. 90 tablet 0    ondansetron (ZOFRAN ODT) 8 MG ODT tab Take 1 tablet (8 mg) by mouth every 8 hours as needed 12 tablet 0    sodium chloride (OCEAN) 0.65 % nasal spray 3 sprays into each nostril 2 times daily 480 mL 0    spironolactone (ALDACTONE) 100 MG tablet Take 1 tablet by mouth daily at 2 pm      tazarotene (TAZORAC) 0.1 % external cream          OBJECTIVE:  There were no vitals taken for this visit.    Physical Exam     ---  This note was written with the assistance of the Dragon voice-dictation technology software. The final document, although reviewed, may contain errors. For corrections, please contact the office.    Total time spent preparing to see the patient, review of chart, obtaining history and physical examination, review of sleep testing, review of treatment options, education, discussion with patient and documenting in Epic / EMR was 25 minutes.  All time involved was spent on the day of service for the patient (the same day as the patient's appointment).    Sachin Wei MD    Sleep Medicine  [place], [place]  Main Office: [phone]  [facility] [address], [address]  Schedule visits: [phone]  Main Office: [phone]  Fax: [phone]

## 2024-02-09 ENCOUNTER — NURSE TRIAGE (OUTPATIENT)
Dept: NURSING | Facility: CLINIC | Age: 29
End: 2024-02-09

## 2024-02-09 ENCOUNTER — VIRTUAL VISIT (OUTPATIENT)
Dept: PULMONOLOGY | Facility: OTHER | Age: 29
End: 2024-02-09
Attending: FAMILY MEDICINE
Payer: COMMERCIAL

## 2024-02-09 VITALS — HEIGHT: 69 IN | BODY MASS INDEX: 25.92 KG/M2 | WEIGHT: 175 LBS

## 2024-02-09 DIAGNOSIS — G47.419 NARCOLEPSY WITHOUT CATAPLEXY(347.00): ICD-10-CM

## 2024-02-09 PROCEDURE — G2211 COMPLEX E/M VISIT ADD ON: HCPCS | Mod: 95 | Performed by: FAMILY MEDICINE

## 2024-02-09 PROCEDURE — 99213 OFFICE O/P EST LOW 20 MIN: CPT | Mod: 95 | Performed by: FAMILY MEDICINE

## 2024-02-09 RX ORDER — METHYLPHENIDATE HYDROCHLORIDE 10 MG/1
TABLET ORAL
Qty: 90 TABLET | Refills: 0 | Status: SHIPPED | OUTPATIENT
Start: 2024-02-09

## 2024-02-09 ASSESSMENT — PAIN SCALES - GENERAL: PAINLEVEL: NO PAIN (0)

## 2024-02-09 ASSESSMENT — PATIENT HEALTH QUESTIONNAIRE - PHQ9: SUM OF ALL RESPONSES TO PHQ QUESTIONS 1-9: 11

## 2024-02-09 NOTE — TELEPHONE ENCOUNTER
"Reason for Disposition   Referral phone numbers for crisis intervention and depression, questions about    Additional Information   Negative: Patient attempted suicide   Negative: Patient is threatening suicide now   Negative: Violent behavior, or threatening to physically hurt or kill someone   Negative: Patient is very confused (disoriented, slurred speech) and no other adult (e.g., friend or family member) available   Negative: Difficult to awaken or acting very confused (disoriented, slurred speech) and of new-onset   Negative: Sounds like a life-threatening emergency to the triager   Negative: Depression is main symptom and is not threatening suicide   Negative: Depression symptoms (sadness, hopelessness, decreased energy) and unable to do any normal activities (e.g., self care, school, work; in comparison to baseline).   Negative: Patient sounds very sick or weak to the triager   Negative: Patient is not threatening suicide now BUT has a suicide PLAN (e.g., overdose, gunshot) and ACCESS (e.g., collecting pills, gun in house)   Negative: Patient is not threatening suicide now BUT has had SUICIDAL BEHAVIORS in past 3 months   Negative: Hearing voice(s) and voices are telling patient to harm themselves   Negative: Patient wants to be seen    Answer Assessment - Initial Assessment Questions  1. MAIN CONCERN: \"What happened that made you call today?\"      Has an appt   didn't pass PHQ-9   2. RISK OF HARM - SUICIDAL IDEATION:  \"Do you ever have thoughts of hurting or killing yourself?\"  (e.g., yes, no, no but preoccupation with thoughts about death)    - WISH TO BE DEAD:  \"Have you wished you were dead or wished you could go to sleep and not wake up?\"    - INTENT:  \"Have you had any thoughts of hurting or killing yourself?\" (e.g., yes, no, N/A) If Yes, ask: \"Are you having these thoughts about killing yourself right NOW?\"    - PLAN: \"Have you thought about how you might do this?\" \"Do you have a specific plan for how " "you would do this?\" (e.g., gun, knife, overdose, no plan, N/A)    - ACCESS: If yes to PLAN, \"Do you have access to ?\" (e.g., pills, gun in house, knife in kitchen)      Rubi has not plan or thoughts of suicide  She is at a stage of life-stressful   finishing school  worrying about money and health insurance  some days feels hopeless  3. RISK OF HARM - SUICIDE ATTEMPT: \"Have you tried to harm yourself recently?\" If Yes, ask: When was this?\"  \"What type of harm was tried?\"      no  4. RISK OF HARM - SUICIDAL BEHAVIOR: \"Have you ever done anything, started to do anything, or prepared to do anything to end your life?\" (e.g., collected pills, bought a gun, wrote a suicide note, cut yourself, started but changed your mind)      no  5. EVENTS AND STRESSORS: \"Has there been any new stress or recent changes in your life?\" (e.g., death of loved one, homelessness, negative event, relationship breakup, work)      Stressful stage of life  6. FUNCTIONAL IMPAIRMENT: \"How have things been going for you overall? Have you had more difficulty than usual doing your normal daily activities?\"  (e.g., better, same, worse; self-care, school, work, interactions)      same  7. SUPPORT: \"Who is with you now?\" \"Who do you live with?\" \"Do you have family or friends who you can talk to?\"       Friends and family  8. THERAPIST: \"Do you have a counselor or therapist? What is their name?\"      Has had many therapist in life   doesn't need or want one now    9. ALCOHOL USE OR SUBSTANCE USE (DRUG USE): \"Do you drink alcohol or use any illegal drugs (or prescription drugs in ways other than prescribed)?\" No      10.. OTHER: \"Do you have any other physical symptoms right now?\" (e.g., fever)        no  11. PREGNANCY or POSTPARTUM: \"Is there any chance you are pregnant?\" \"When was your last menstrual period?\" \"Were you recently pregnant?\" \"When did you give birth?\"        no    Protocols used: Suicide [id number]    "

## 2024-02-09 NOTE — NURSING NOTE
Is the patient currently in the state of [place]? YES    Visit mode:VIDEO    If the visit is dropped, the patient can be reconnected by: VIDEO VISIT: Send to e-mail at: [email or web address]    Will anyone else be joining the visit? NO  (If patient encounters technical issues they should call [phone]    How would you like to obtain your AVS? MyChart    Are changes needed to the allergy or medication list? Pt stated no changes to allergies and Pt stated no med changes    Reason for visit: RECHECK  Has patient had flu shot for current/most recent flu season? If so, when? Yes: 11/08/2023  Depression Response    Patient completed the PHQ-9 assessment for depression and scored >9? Yes-11  Question 9 on the PHQ-9 was positive for suicidality? Yes  Does patient have current mental health provider? No    Is this a virtual visit? Yes   Does patient have suicidal ideation (positive question 9)? Yes (adult) - transfer to [facility] Triage ([phone]) Patient transferred. Ok to continue with appointment per Stephany Barnett RN    I personally notified the following: visit provider           Klarissa ADDISON

## 2024-02-22 ENCOUNTER — MYC MEDICAL ADVICE (OUTPATIENT)
Dept: PULMONOLOGY | Facility: OTHER | Age: 29
End: 2024-02-22

## 2024-02-22 DIAGNOSIS — G47.419 NARCOLEPSY WITHOUT CATAPLEXY(347.00): Primary | ICD-10-CM

## 2024-02-29 RX ORDER — METHYLPHENIDATE HYDROCHLORIDE 20 MG/1
10 TABLET ORAL 3 TIMES DAILY PRN
Qty: 45 TABLET | Refills: 0 | Status: SHIPPED | OUTPATIENT
Start: 2024-02-29

## 2024-07-14 ENCOUNTER — HEALTH MAINTENANCE LETTER (OUTPATIENT)
Age: 29
End: 2024-07-14

## (undated) DEVICE — SUCTION MANIFOLD NEPTUNE 2 SYS 1 PORT 702-025-000

## (undated) DEVICE — ESU GROUND PAD ADULT W/CORD E7507

## (undated) DEVICE — SPONGE COTTONOID 2X1/2" 80-1406

## (undated) DEVICE — SOL NACL 0.9% IRRIG 500ML BOTTLE 2F7123

## (undated) DEVICE — ESU ELEC NDL 1" COATED/INSULATED E1465

## (undated) DEVICE — ENDO SHEATH STORZ SHARPSITE ENDOSCRUB 0DEG 4MM 1912000

## (undated) DEVICE — SYR 03ML LL W/O NDL 309657

## (undated) DEVICE — SPLINT NASAL DOYLE BREATHEASY 20-10500

## (undated) DEVICE — ESU PENCIL W/HOLSTER

## (undated) DEVICE — WIPE INSTRUMENT MEROCEL 400200

## (undated) DEVICE — BLADE TURBINATE INFERIOR 2MM ROTATE  1882040HR

## (undated) DEVICE — SYR 30ML LL W/O NDL 302832

## (undated) DEVICE — PACK ENT ENDOSCOPY CUSTOM ASC

## (undated) DEVICE — GLOVE PROTEXIS MICRO 7.5  2D73PM75

## (undated) DEVICE — NDL 25GA 2"  8881200441

## (undated) DEVICE — LINEN TOWEL PACK X5 5464

## (undated) DEVICE — EYE STRIP FLUORESCEIN TEST 1MG BIO-GLO HUO900-11

## (undated) DEVICE — DRAPE WARMER 66X44" ORS-300

## (undated) RX ORDER — CLINDAMYCIN PHOSPHATE 900 MG/50ML
INJECTION, SOLUTION INTRAVENOUS
Status: DISPENSED
Start: 2023-06-29

## (undated) RX ORDER — PROPOFOL 10 MG/ML
INJECTION, EMULSION INTRAVENOUS
Status: DISPENSED
Start: 2023-06-29

## (undated) RX ORDER — FENTANYL CITRATE 50 UG/ML
INJECTION, SOLUTION INTRAMUSCULAR; INTRAVENOUS
Status: DISPENSED
Start: 2023-06-29

## (undated) RX ORDER — DEXAMETHASONE SODIUM PHOSPHATE 4 MG/ML
INJECTION, SOLUTION INTRA-ARTICULAR; INTRALESIONAL; INTRAMUSCULAR; INTRAVENOUS; SOFT TISSUE
Status: DISPENSED
Start: 2023-06-29

## (undated) RX ORDER — CEFAZOLIN SODIUM 2 G/50ML
SOLUTION INTRAVENOUS
Status: DISPENSED
Start: 2023-06-29

## (undated) RX ORDER — SCOLOPAMINE TRANSDERMAL SYSTEM 1 MG/1
PATCH, EXTENDED RELEASE TRANSDERMAL
Status: DISPENSED
Start: 2023-06-29

## (undated) RX ORDER — ACETAMINOPHEN 325 MG/1
TABLET ORAL
Status: DISPENSED
Start: 2023-06-29

## (undated) RX ORDER — ONDANSETRON 2 MG/ML
INJECTION INTRAMUSCULAR; INTRAVENOUS
Status: DISPENSED
Start: 2023-06-29